# Patient Record
Sex: FEMALE | Race: BLACK OR AFRICAN AMERICAN | NOT HISPANIC OR LATINO | Employment: OTHER | URBAN - METROPOLITAN AREA
[De-identification: names, ages, dates, MRNs, and addresses within clinical notes are randomized per-mention and may not be internally consistent; named-entity substitution may affect disease eponyms.]

---

## 2017-08-10 ENCOUNTER — COMMUNICATION - HEALTHEAST (OUTPATIENT)
Dept: SCHEDULING | Facility: CLINIC | Age: 47
End: 2017-08-10

## 2018-09-05 ENCOUNTER — COMMUNICATION - HEALTHEAST (OUTPATIENT)
Dept: SCHEDULING | Facility: CLINIC | Age: 48
End: 2018-09-05

## 2019-09-18 ENCOUNTER — COMMUNICATION - HEALTHEAST (OUTPATIENT)
Dept: SCHEDULING | Facility: CLINIC | Age: 49
End: 2019-09-18

## 2021-06-01 NOTE — TELEPHONE ENCOUNTER
Pt was seen in ED yesterday for abdominal pain.   Pt had nausea.   Today patient started to vomit.   Unable to keep anything down. Pt states she can't eat.   Pt states she vomits even water.   Last time she had to urinate was yesterday.   Vomited more than 10 times.   Reason for Disposition    [1] Drinking very little AND [2] dehydration suspected (e.g., no urine > 12 hours, very dry mouth, very lightheaded)    [1] SEVERE vomiting (e.g., 6 or more times/day) AND [2] present > 8 hours    Protocols used: VOMITING-A-AH    Pt states she has not used the bathroom at all today. No stool or urine all day.   RN advised pt needs to go to the ED for evaluation. Pt will have her son drive her.   Ally Barron, RN   [facility] RN Triage

## 2021-06-16 PROBLEM — F12.10 CANNABIS USE DISORDER, MILD, ABUSE: Status: ACTIVE | Noted: 2019-07-08

## 2021-07-26 ENCOUNTER — APPOINTMENT (OUTPATIENT)
Dept: RADIOLOGY | Facility: CLINIC | Age: 51
End: 2021-07-26
Attending: STUDENT IN AN ORGANIZED HEALTH CARE EDUCATION/TRAINING PROGRAM
Payer: MEDICARE

## 2021-07-26 PROCEDURE — C9803 HOPD COVID-19 SPEC COLLECT: HCPCS

## 2021-07-26 PROCEDURE — 71046 X-RAY EXAM CHEST 2 VIEWS: CPT

## 2021-07-26 PROCEDURE — 99284 EMERGENCY DEPT VISIT MOD MDM: CPT | Mod: 25

## 2021-07-26 ASSESSMENT — MIFFLIN-ST. JEOR: SCORE: 1306.28

## 2021-07-27 ENCOUNTER — HOSPITAL ENCOUNTER (EMERGENCY)
Facility: CLINIC | Age: 51
Discharge: HOME | End: 2021-07-27
Attending: EMERGENCY MEDICINE | Admitting: EMERGENCY MEDICINE
Payer: MEDICARE

## 2021-07-27 VITALS
OXYGEN SATURATION: 99 % | RESPIRATION RATE: 18 BRPM | WEIGHT: 150 LBS | TEMPERATURE: 100 F | DIASTOLIC BLOOD PRESSURE: 82 MMHG | HEIGHT: 65 IN | BODY MASS INDEX: 24.99 KG/M2 | SYSTOLIC BLOOD PRESSURE: 132 MMHG | HEART RATE: 108 BPM

## 2021-07-27 DIAGNOSIS — J20.9 ACUTE BRONCHITIS, UNSPECIFIED ORGANISM: ICD-10-CM

## 2021-07-27 DIAGNOSIS — J45.21 MILD INTERMITTENT ASTHMA WITH EXACERBATION: ICD-10-CM

## 2021-07-27 LAB — SARS-COV-2 RNA RESP QL NAA+PROBE: NEGATIVE

## 2021-07-27 PROCEDURE — 250N000012 HC RX MED GY IP 250 OP 636 PS 637: Performed by: EMERGENCY MEDICINE

## 2021-07-27 PROCEDURE — 250N000013 HC RX MED GY IP 250 OP 250 PS 637: Performed by: EMERGENCY MEDICINE

## 2021-07-27 PROCEDURE — C9803 HOPD COVID-19 SPEC COLLECT: HCPCS

## 2021-07-27 PROCEDURE — 87635 SARS-COV-2 COVID-19 AMP PRB: CPT | Performed by: STUDENT IN AN ORGANIZED HEALTH CARE EDUCATION/TRAINING PROGRAM

## 2021-07-27 RX ORDER — PREDNISONE 20 MG/1
60 TABLET ORAL DAILY
Qty: 12 TABLET | Refills: 0 | Status: SHIPPED | OUTPATIENT
Start: 2021-07-28 | End: 2021-08-01

## 2021-07-27 RX ORDER — PREDNISONE 20 MG/1
60 TABLET ORAL ONCE
Status: COMPLETED | OUTPATIENT
Start: 2021-07-27 | End: 2021-07-27

## 2021-07-27 RX ORDER — ACETAMINOPHEN 325 MG/1
650 TABLET ORAL ONCE
Status: COMPLETED | OUTPATIENT
Start: 2021-07-27 | End: 2021-07-27

## 2021-07-27 RX ADMIN — ACETAMINOPHEN 650 MG: 325 TABLET ORAL at 03:02

## 2021-07-27 RX ADMIN — PREDNISONE 60 MG: 20 TABLET ORAL at 03:29

## 2021-07-27 NOTE — DISCHARGE INSTRUCTIONS
Continue your inhaler as previously prescribed  Follow-up with your primary care doctor in the next 1 to 2 days for recheck  Return to the emergency department for worsening problems or concerns

## 2021-07-27 NOTE — ED PROVIDER NOTES
EMERGENCY DEPARTMENT ENCOUnter      NAME: Fallon Sandhu  AGE: 50 year old female  YOB: 1970  MRN: [id number]  EVALUATION DATE & TIME: 7/27/2021  2:38 AM    PCP: No primary care provider on file.    ED PROVIDER: Elke Fonseca MD      Chief Complaint   Patient presents with     Cough         FINAL IMPRESSION:  1. Mild intermittent asthma with exacerbation    2. Acute bronchitis, unspecified organism          ED COURSE & MEDICAL DECISION MAKING:    PPE: N95    In summary, the patient is a 50-year-old female that presents to the emergency department for evaluation of a cough thought secondary to bronchitis and asthma exacerbation.  She has no evidence of pneumonia on her x-ray.  She no evidence of a COVID-19 infection.  3:21 AM I met with the patient, obtained history, performed an initial exam, and discussed options and plan for diagnostics and treatment here in the ED. Tylenol 650 mg p.o. was administered for pain.  Prednisone 60 mg p.o. was administered as an anti-inflammatory for her bronchospasm.      At the conclusion of the encounter I discussed the results of all of the tests and the disposition. The questions were answered. The patient or family acknowledged understanding and was agreeable with the care plan.         MEDICATIONS GIVEN IN THE EMERGENCY:  Medications   acetaminophen (TYLENOL) tablet 650 mg (650 mg Oral Given 7/27/21 0302)   predniSONE (DELTASONE) tablet 60 mg (60 mg Oral Given 7/27/21 0329)       NEW PRESCRIPTIONS STARTED AT TODAY'S ER VISIT  Discharge Medication List as of 7/27/2021  3:34 AM      START taking these medications    Details   predniSONE (DELTASONE) 20 MG tablet Take 3 tablets (60 mg) by mouth daily for 4 days Take two tablets (= 40mg) each day for 5 (five) days, Disp-12 tablet, R-0, Local Print                =================================================================    HPI        Fallon Sandhu is a 50 year old female with a pertinent history of  asthma, depression who presents to this ED for evaluation of cough.     The patient reports 1 week of coughing. She coughs so hard she vomits sometimes. The patient is also feeling week. She smokes cigarettes but does not drink. She is allergic to ibuprofen. No other complaints.       REVIEW OF SYSTEMS     Constitutional:  Denies fever or chills. Positive for weakness.   HENT:  Denies sore throat   Respiratory:  Denies shortness of breath. Positive for cough.    Cardiovascular:  Denies chest pain or palpitations  GI:  Denies abdominal pain, nausea. Positive for vomiting.   Musculoskeletal:  Denies any new extremity pain   Skin:  Denies rash   Neurologic:  Denies headache, focal weakness or sensory changes    All other systems reviewed and are negative      PAST MEDICAL HISTORY:  Past Medical History:   Diagnosis Date     Asthma      Depression      Migraines      Miscarriage      Pneumonia        PAST SURGICAL HISTORY:  Past Surgical History:   Procedure Laterality Date     APPENDECTOMY        SECTION      5x     CHOLECYSTECTOMY       RIGHT OOPHORECTOMY             CURRENT MEDICATIONS:    predniSONE (DELTASONE) 20 MG tablet  acetaminophen (TYLENOL EXTRA STRENGTH) 500 MG tablet  doxylamine (UNISON) 25 mg tablet  ibuprofen (ADVIL,MOTRIN) 600 MG tablet  omeprazole (PRILOSEC) 20 MG capsule  ondansetron (ZOFRAN ODT) 4 MG disintegrating tablet  ondansetron (ZOFRAN) 4 MG tablet  QUEtiapine (SEROQUEL) 200 MG tablet  traMADol (ULTRAM) 50 mg tablet        ALLERGIES:  Allergies   Allergen Reactions     Ibuprofen Itching       FAMILY HISTORY:  Family History   Problem Relation Age of Onset     Hypertension Mother      Diabetes Mother      Hypertension Father      Diabetes Father        SOCIAL HISTORY:   Social History     Socioeconomic History     Marital status: Single     Spouse name: Not on file     Number of children: Not on file     Years of education: Not on file     Highest education level: Not on file    Occupational History     Not on file   Tobacco Use     Smoking status: Current Every Day Smoker     Packs/day: 0.50     Years: 10.00     Pack years: 5.00     Smokeless tobacco: Never Used   Substance and Sexual Activity     Alcohol use: No     Drug use: Yes     Types: Marijuana     Sexual activity: Not on file   Other Topics Concern     Not on file   Social History Narrative     Not on file     Social Determinants of Health     Financial Resource Strain:      Difficulty of Paying Living Expenses:    Food Insecurity:      Worried About Running Out of Food in the Last Year:      Ran Out of Food in the Last Year:    Transportation Needs:      Lack of Transportation (Medical):      Lack of Transportation (Non-Medical):    Physical Activity:      Days of Exercise per Week:      Minutes of Exercise per Session:    Stress:      Feeling of Stress :    Social Connections:      Frequency of Communication with Friends and Family:      Frequency of Social Gatherings with Friends and Family:      Attends Mu-ism Services:      Active Member of Clubs or Organizations:      Attends Club or Organization Meetings:      Marital Status:    Intimate Partner Violence:      Fear of Current or Ex-Partner:      Emotionally Abused:      Physically Abused:      Sexually Abused:          PHYSICAL EXAM    Constitutional:  Well developed, Well nourished,  HENT:  Normocephalic, Atraumatic, Bilateral external ears normal, Oropharynx moist, Nose normal.   Neck:  Normal range of motion, No meningismus, No stridor.   Eyes:  EOMI, Conjunctiva normal, No discharge.   Respiratory:  No respiratory distress, bilateral wheezing few scattered rhonchi, No chest tenderness.   Cardiovascular:  Normal heart rate, Normal rhythm, No murmurs  GI:  Soft, No tenderness, No guarding, No CVA tenderness.   Musculoskeletal:  Neurovascularly intact distally, No edema, No tenderness, No cyanosis, Good range of motion in all major joints. No tenderness to palpation or  major deformities noted.   Integument:  Warm, Dry, No erythema, No rash.   Lymphatic:  No lymphadenopathy noted.   Neurologic:  Alert & oriented x 3, Normal motor function, Normal sensory function, No focal deficits noted.   Psychiatric:  Affect normal, Judgment normal, Mood normal.      LAB:  Results for orders placed or performed during the hospital encounter of 07/27/21   Chest XR,  PA & LAT    Impression    IMPRESSION: Negative chest.   SARS-COV2 (COVID-19) Virus RT-PCR    Specimen: Nasopharyngeal; Swab   Result Value Ref Range    SARS CoV2 PCR Negative Negative       RADIOLOGY:  I have independently reviewed and interpreted the above imaging, pending the final radiology read.  Chest XR,  PA & LAT   Final Result   IMPRESSION: Negative chest.                I, Heather Cardona, am serving as a scribe to document services personally performed by Dr. Fonseca based on my observation and the provider's statements to me. I, Elke Fonseca MD attest that Heather Cardona is acting in a scribe capacity, has observed my performance of the services and has documented them in accordance with my direction.    Elke Fonseca MD  Emergency Medicine  [facility] EMERGENCY ROOM  [address] [facility] [id number] [phone]  Dept: [phone]     Elke Fonseca MD  07/30/21 7226

## 2021-07-27 NOTE — ED TRIAGE NOTES
One week history of non productive cough. Has not been covid vaccination. History of asthma. All over body aches. Denies fever. States coughing so hard that she vomits. States she has not been vaccinated and she is scared to have it

## 2021-08-14 ENCOUNTER — HOSPITAL ENCOUNTER (EMERGENCY)
Facility: CLINIC | Age: 51
Discharge: HOME | End: 2021-08-14
Attending: EMERGENCY MEDICINE | Admitting: EMERGENCY MEDICINE
Payer: MEDICARE

## 2021-08-14 VITALS
DIASTOLIC BLOOD PRESSURE: 87 MMHG | TEMPERATURE: 98.7 F | SYSTOLIC BLOOD PRESSURE: 140 MMHG | BODY MASS INDEX: 24.99 KG/M2 | HEIGHT: 65 IN | OXYGEN SATURATION: 99 % | WEIGHT: 150 LBS | HEART RATE: 91 BPM | RESPIRATION RATE: 18 BRPM

## 2021-08-14 DIAGNOSIS — N10 ACUTE PYELONEPHRITIS: ICD-10-CM

## 2021-08-14 LAB
ALBUMIN SERPL-MCNC: 3.4 G/DL (ref 3.5–5)
ALBUMIN UR-MCNC: 30 MG/DL
ALP SERPL-CCNC: 94 U/L (ref 45–120)
ALT SERPL W P-5'-P-CCNC: 93 U/L (ref 0–45)
ANION GAP SERPL CALCULATED.3IONS-SCNC: 11 MMOL/L (ref 5–18)
APPEARANCE UR: ABNORMAL
AST SERPL W P-5'-P-CCNC: 76 U/L (ref 0–40)
BACTERIA #/AREA URNS HPF: ABNORMAL /HPF
BASOPHILS # BLD AUTO: 0 10E3/UL (ref 0–0.2)
BASOPHILS NFR BLD AUTO: 0 %
BILIRUB SERPL-MCNC: 0.6 MG/DL (ref 0–1)
BILIRUB UR QL STRIP: NEGATIVE
BUN SERPL-MCNC: 4 MG/DL (ref 8–22)
CALCIUM SERPL-MCNC: 9.4 MG/DL (ref 8.5–10.5)
CHLORIDE BLD-SCNC: 107 MMOL/L (ref 98–107)
CLUE CELLS: ABNORMAL
CO2 SERPL-SCNC: 22 MMOL/L (ref 22–31)
COLOR UR AUTO: YELLOW
CREAT SERPL-MCNC: 0.65 MG/DL (ref 0.6–1.1)
EOSINOPHIL # BLD AUTO: 0.1 10E3/UL (ref 0–0.7)
EOSINOPHIL NFR BLD AUTO: 1 %
ERYTHROCYTE [DISTWIDTH] IN BLOOD BY AUTOMATED COUNT: 13.1 % (ref 10–15)
GFR SERPL CREATININE-BSD FRML MDRD: >90 ML/MIN/1.73M2
GLUCOSE BLD-MCNC: 118 MG/DL (ref 70–125)
GLUCOSE UR STRIP-MCNC: NEGATIVE MG/DL
HCT VFR BLD AUTO: 42.2 % (ref 35–47)
HGB BLD-MCNC: 14.3 G/DL (ref 11.7–15.7)
HGB UR QL STRIP: ABNORMAL
IMM GRANULOCYTES # BLD: 0.1 10E3/UL
IMM GRANULOCYTES NFR BLD: 1 %
KETONES UR STRIP-MCNC: NEGATIVE MG/DL
LEUKOCYTE ESTERASE UR QL STRIP: ABNORMAL
LIPASE SERPL-CCNC: 9 U/L (ref 0–52)
LYMPHOCYTES # BLD AUTO: 2.7 10E3/UL (ref 0.8–5.3)
LYMPHOCYTES NFR BLD AUTO: 38 %
MAGNESIUM SERPL-MCNC: 1.7 MG/DL (ref 1.8–2.6)
MCH RBC QN AUTO: 31.3 PG (ref 26.5–33)
MCHC RBC AUTO-ENTMCNC: 33.9 G/DL (ref 31.5–36.5)
MCV RBC AUTO: 92 FL (ref 78–100)
MONOCYTES # BLD AUTO: 0.9 10E3/UL (ref 0–1.3)
MONOCYTES NFR BLD AUTO: 12 %
MUCOUS THREADS #/AREA URNS LPF: PRESENT /LPF
NEUTROPHILS # BLD AUTO: 3.5 10E3/UL (ref 1.6–8.3)
NEUTROPHILS NFR BLD AUTO: 48 %
NITRATE UR QL: POSITIVE
NRBC # BLD AUTO: 0 10E3/UL
NRBC BLD AUTO-RTO: 0 /100
PH UR STRIP: 6.5 [PH] (ref 5–7)
PLATELET # BLD AUTO: 370 10E3/UL (ref 150–450)
POTASSIUM BLD-SCNC: 3.7 MMOL/L (ref 3.5–5)
PROT SERPL-MCNC: 6.9 G/DL (ref 6–8)
RBC # BLD AUTO: 4.57 10E6/UL (ref 3.8–5.2)
RBC URINE: 5 /HPF
SODIUM SERPL-SCNC: 140 MMOL/L (ref 136–145)
SP GR UR STRIP: 1.02 (ref 1–1.03)
SQUAMOUS EPITHELIAL: 3 /HPF
TRICHOMONAS, WET PREP: ABNORMAL
UROBILINOGEN UR STRIP-MCNC: <2 MG/DL
WBC # BLD AUTO: 7.2 10E3/UL (ref 4–11)
WBC CLUMPS #/AREA URNS HPF: PRESENT /HPF
WBC URINE: 130 /HPF
WBC'S/HIGH POWER FIELD, WET PREP: ABNORMAL
YEAST, WET PREP: PRESENT

## 2021-08-14 PROCEDURE — 250N000013 HC RX MED GY IP 250 OP 250 PS 637: Performed by: EMERGENCY MEDICINE

## 2021-08-14 PROCEDURE — 96375 TX/PRO/DX INJ NEW DRUG ADDON: CPT

## 2021-08-14 PROCEDURE — 36415 COLL VENOUS BLD VENIPUNCTURE: CPT | Performed by: EMERGENCY MEDICINE

## 2021-08-14 PROCEDURE — 96365 THER/PROPH/DIAG IV INF INIT: CPT

## 2021-08-14 PROCEDURE — 83690 ASSAY OF LIPASE: CPT | Performed by: EMERGENCY MEDICINE

## 2021-08-14 PROCEDURE — 258N000003 HC RX IP 258 OP 636: Performed by: EMERGENCY MEDICINE

## 2021-08-14 PROCEDURE — 83735 ASSAY OF MAGNESIUM: CPT | Performed by: EMERGENCY MEDICINE

## 2021-08-14 PROCEDURE — 81001 URINALYSIS AUTO W/SCOPE: CPT | Performed by: EMERGENCY MEDICINE

## 2021-08-14 PROCEDURE — 87086 URINE CULTURE/COLONY COUNT: CPT | Performed by: EMERGENCY MEDICINE

## 2021-08-14 PROCEDURE — 80053 COMPREHEN METABOLIC PANEL: CPT | Performed by: EMERGENCY MEDICINE

## 2021-08-14 PROCEDURE — 85025 COMPLETE CBC W/AUTO DIFF WBC: CPT | Performed by: EMERGENCY MEDICINE

## 2021-08-14 PROCEDURE — 87210 SMEAR WET MOUNT SALINE/INK: CPT | Performed by: EMERGENCY MEDICINE

## 2021-08-14 PROCEDURE — 99284 EMERGENCY DEPT VISIT MOD MDM: CPT | Mod: 25

## 2021-08-14 PROCEDURE — 250N000011 HC RX IP 250 OP 636: Performed by: EMERGENCY MEDICINE

## 2021-08-14 RX ORDER — ONDANSETRON 4 MG/1
4 TABLET, ORALLY DISINTEGRATING ORAL EVERY 8 HOURS PRN
Qty: 10 TABLET | Refills: 0 | Status: SHIPPED | OUTPATIENT
Start: 2021-08-14 | End: 2021-08-17

## 2021-08-14 RX ORDER — MAGNESIUM OXIDE 400 MG/1
400 TABLET ORAL ONCE
Status: COMPLETED | OUTPATIENT
Start: 2021-08-14 | End: 2021-08-14

## 2021-08-14 RX ORDER — FLUCONAZOLE 150 MG/1
150 TABLET ORAL ONCE
Status: COMPLETED | OUTPATIENT
Start: 2021-08-14 | End: 2021-08-14

## 2021-08-14 RX ORDER — LEVOFLOXACIN 500 MG/1
500 TABLET, FILM COATED ORAL DAILY
Qty: 7 TABLET | Refills: 0 | Status: SHIPPED | OUTPATIENT
Start: 2021-08-14 | End: 2021-08-21

## 2021-08-14 RX ORDER — DIPHENHYDRAMINE HYDROCHLORIDE 50 MG/ML
25 INJECTION INTRAMUSCULAR; INTRAVENOUS ONCE
Status: COMPLETED | OUTPATIENT
Start: 2021-08-14 | End: 2021-08-14

## 2021-08-14 RX ORDER — LEVOFLOXACIN 5 MG/ML
500 INJECTION, SOLUTION INTRAVENOUS ONCE
Status: COMPLETED | OUTPATIENT
Start: 2021-08-14 | End: 2021-08-14

## 2021-08-14 RX ORDER — METOCLOPRAMIDE HYDROCHLORIDE 5 MG/ML
5 INJECTION INTRAMUSCULAR; INTRAVENOUS ONCE
Status: COMPLETED | OUTPATIENT
Start: 2021-08-14 | End: 2021-08-14

## 2021-08-14 RX ORDER — FLUCONAZOLE 150 MG/1
150 TABLET ORAL DAILY
Status: DISCONTINUED | OUTPATIENT
Start: 2021-08-14 | End: 2021-08-14

## 2021-08-14 RX ADMIN — FLUCONAZOLE 150 MG: 150 TABLET ORAL at 06:58

## 2021-08-14 RX ADMIN — LEVOFLOXACIN 500 MG: 5 INJECTION, SOLUTION INTRAVENOUS at 06:27

## 2021-08-14 RX ADMIN — MAGNESIUM OXIDE TAB 400 MG (241.3 MG ELEMENTAL MG) 400 MG: 400 (241.3 MG) TAB at 06:57

## 2021-08-14 RX ADMIN — DIPHENHYDRAMINE HYDROCHLORIDE 25 MG: 50 INJECTION INTRAMUSCULAR; INTRAVENOUS at 06:03

## 2021-08-14 RX ADMIN — METOCLOPRAMIDE 5 MG: 5 INJECTION, SOLUTION INTRAMUSCULAR; INTRAVENOUS at 06:05

## 2021-08-14 RX ADMIN — SODIUM CHLORIDE 1000 ML: 9 INJECTION, SOLUTION INTRAVENOUS at 06:02

## 2021-08-14 ASSESSMENT — ENCOUNTER SYMPTOMS
COUGH: 1
NAUSEA: 1
VOMITING: 1
DYSURIA: 1

## 2021-08-14 ASSESSMENT — MIFFLIN-ST. JEOR: SCORE: 1306.28

## 2021-08-14 NOTE — ED TRIAGE NOTES
"Pt c/o SOB, chest pain, nausea with  4 episodes of emesis. Pt states that she was recently dx with bronchitis. Pt says that she was supposed to follow up with her primary but they've been out of town. Pt complains of muscle aches \"all over\" and is in \"a lot of pain\".  Pt has had loss of appetite.   "

## 2021-08-14 NOTE — ED PROVIDER NOTES
NAME: Fallon Sandhu  AGE: 50 year old female  YOB: 1970  MRN: [id number]  EVALUATION DATE & TIME: No admission date for patient encounter.    PCP: No primary care provider on file.    ED PROVIDER: Brian Harrell M.D.      Chief Complaint   Patient presents with     Shortness of Breath     Chest Pain     Nausea       FINAL IMPRESSION:  1. Acute pyelonephritis        MEDICAL DECISION MAKIN:10 AM I met with the patient, obtained history, performed an initial exam, and discussed options and plan for diagnostics and treatment here in the ED.    PPE: Provider wore gloves, N95 mask, eye protection, surgical cap, and paper mask.      Patient was clinically assessed and consented to treatment. After assessment, medical decision making and workup were discussed with the patient. The patient was agreeable to plan for testing, workup, and treatment.  Pertinent Labs & Imaging studies reviewed. (See chart for details)         Fallon Sandhu is a 50 year oldfemale who presents with shortness of breath, chest tightness and nausea with vomiting.   Differential diagnosis includes but not limited to bronchitis, COVID-19, pneumonia, asthma exacerbation, dehydration, urinary tract infection, vulvovaginal candidiasis.  Patient presenting for multiple complaints.  Patient previously seen and diagnosed with bronchitis after negative Covid swab on the .  She reports this cough and shortness of breath has improved though elicit the symptoms.  She does not appear short of breath but reports reports body aches along with nausea and vomiting for the last several days.  She finished her course of steroids reports the cough is dramatically better after that along with the shortness of breath but does still have some wheezing which she relates to her asthma.  She does not appear in acute respiratory distress however will plan to likely administer nebulization once nausea is controlled.  Patient very anxious and  rocking in bed which on review patient does have some mental health and anxiety issues.  Given the nausea will administer Reglan and Benadryl along with some IV fluids.  Patient is allergic to ibuprofen but will hold off narcotic pain medication as patient does have chronic pain.  Patient also complained about vaginal or urethral burning and will check wet prep and urinalysis to.  Possibly urinary tract infection could contribute to symptoms.  Awaiting labs and urine and will recheck breathing after nebulizer treatment.  Lungs are otherwise clear and I do not suspect pneumonia.  Patient's COVID-19 test was also negative on the 27th.  Labs did show a left shift but no dramatic leukocytosis.  Hemoglobin is stable and electrolytes were unremarkable.  Urinalysis did show significant findings of urinary tract infection.  Patient started on Levaquin here and was given some IV fluids with good relief.  Patient much more comfortable and calmer after the Compazine and Benadryl.  I discussed findings with patient and she was feeling much better and comfortable going home.  Wet prep also returned showing likely candidiasis and patient will be given 1 dose of Diflucan here.  Following discussion with patient she will finish her infusion of Levaquin and be plan for discharge home with 1 week course of Levaquin along with Zofran for nausea.    The importance of close follow up was discussed. We reviewed warning signs and symptoms, and I instructed Ms. Sandhu to return to the emergency department immediately if she develops any new or worsening symptoms. I provided additional verbal discharge instructions. Ms. Sandhu expressed understanding and agreement with this plan of care, her questions were answered, and she was discharged in stable condition.       0 minutes of critical care time    MEDICATIONS GIVEN IN THE EMERGENCY:  Medications   levofloxacin (LEVAQUIN) infusion 500 mg (500 mg Intravenous New Bag 8/14/21 0627)    magnesium oxide (MAG-OX) tablet 400 mg (has no administration in time range)   fluconazole (DIFLUCAN) tablet 150 mg (has no administration in time range)   0.9% sodium chloride BOLUS (1,000 mLs Intravenous New Bag 8/14/21 0602)   metoclopramide (REGLAN) injection 5 mg (5 mg Intravenous Given 8/14/21 0605)   diphenhydrAMINE (BENADRYL) injection 25 mg (25 mg Intravenous Given 8/14/21 0603)       NEW PRESCRIPTIONS STARTED AT TODAY'S ER VISIT:  New Prescriptions    LEVOFLOXACIN (LEVAQUIN) 500 MG TABLET    Take 1 tablet (500 mg) by mouth daily for 7 days    ONDANSETRON (ZOFRAN ODT) 4 MG ODT TAB    Take 1 tablet (4 mg) by mouth every 8 hours as needed for nausea       =================================================================    HPI    Patient information was obtained from: Patient     Use of : N/A         Fallon Sandhu is a 50 year old female with a past medical history of schizophrenia, cholecystectomy, appendectomy, oophorectomy, and chronic pain disorder who presents to the ED via walk-in for evaluation of shortness of breath, chest pain, emesis, and body aches.     Patient was seen in the ED on 7/27 for a cough where she tested negative for COVID-19. Patient was started on a course of steroids, which has improved her cough. Patient endorses continued body aches, nausea, and intermittent episodes of emesis for the past couple of days. Patient states that her cough has improved but she is overall feeling worse.     Per chart review,   7/27/21 patient visited [facility] ED for evaluation of a cough. Cough thought secondary to bronchitis and asthma exacerbation. No evidence of pneumonia on XR. Negative COVID-19. Tylenol 650mg administered for pain, Prednisone 60 mg administered as anti-inflammatory for bronchospasm.      REVIEW OF SYSTEMS   Review of Systems   Constitutional:        Positive for body aches   Respiratory: Positive for cough.    Gastrointestinal: Positive for nausea and vomiting.    Genitourinary: Positive for dysuria and vaginal pain (Burning).   All other systems reviewed and are negative.       PAST MEDICAL HISTORY:  Past Medical History:   Diagnosis Date     Asthma      Depression      Migraines      Miscarriage      Pneumonia        PAST SURGICAL HISTORY:  Past Surgical History:   Procedure Laterality Date     APPENDECTOMY        SECTION      5x     CHOLECYSTECTOMY       RIGHT OOPHORECTOMY         CURRENT MEDICATIONS:      Current Facility-Administered Medications:      fluconazole (DIFLUCAN) tablet 150 mg, 150 mg, Oral, Once, Brian Harrell MD     levofloxacin (LEVAQUIN) infusion 500 mg, 500 mg, Intravenous, Once, Brian Harrell MD, Last Rate: 100 mL/hr at 21, 500 mg at 21     magnesium oxide (MAG-OX) tablet 400 mg, 400 mg, Oral, Once, Brian Harrell MD    Current Outpatient Medications:      levofloxacin (LEVAQUIN) 500 MG tablet, Take 1 tablet (500 mg) by mouth daily for 7 days, Disp: 7 tablet, Rfl: 0     ondansetron (ZOFRAN ODT) 4 MG ODT tab, Take 1 tablet (4 mg) by mouth every 8 hours as needed for nausea, Disp: 10 tablet, Rfl: 0     acetaminophen (TYLENOL EXTRA STRENGTH) 500 MG tablet, [ACETAMINOPHEN (TYLENOL EXTRA STRENGTH) 500 MG TABLET] Take 2 tablets (1,000 mg total) by mouth every 6 (six) hours as needed for pain., Disp: , Rfl: 0     doxylamine (UNISON) 25 mg tablet, [DOXYLAMINE (UNISON) 25 MG TABLET] Take 25-50 mg by mouth daily as needed for sleep., Disp: , Rfl:      ibuprofen (ADVIL,MOTRIN) 600 MG tablet, [IBUPROFEN (ADVIL,MOTRIN) 600 MG TABLET] Take 600 mg by mouth every 8 (eight) hours as needed for pain., Disp: , Rfl:      omeprazole (PRILOSEC) 20 MG capsule, [OMEPRAZOLE (PRILOSEC) 20 MG CAPSULE] Take 1 capsule (20 mg total) by mouth daily before breakfast., Disp: 30 capsule, Rfl: 0     ondansetron (ZOFRAN ODT) 4 MG disintegrating tablet, [ONDANSETRON (ZOFRAN ODT) 4 MG DISINTEGRATING TABLET] Take 1  tablet (4 mg total) by mouth every 8 (eight) hours as needed., Disp: 15 tablet, Rfl: 0     ondansetron (ZOFRAN) 4 MG tablet, [ONDANSETRON (ZOFRAN) 4 MG TABLET] Take 1 tablet (4 mg total) by mouth every 6 (six) hours., Disp: 12 tablet, Rfl: 0     QUEtiapine (SEROQUEL) 200 MG tablet, [QUETIAPINE (SEROQUEL) 200 MG TABLET] Take 200 mg by mouth daily before breakfast. , Disp: , Rfl:      traMADol (ULTRAM) 50 mg tablet, [TRAMADOL (ULTRAM) 50 MG TABLET] Take 1 tablet (50 mg total) by mouth every 6 (six) hours as needed for pain or severe pain (7-10)., Disp: 20 tablet, Rfl: 0    ALLERGIES:  Allergies   Allergen Reactions     Ibuprofen Itching       FAMILY HISTORY:  Family History   Problem Relation Age of Onset     Hypertension Mother      Diabetes Mother      Hypertension Father      Diabetes Father        SOCIAL HISTORY:   Social History     Socioeconomic History     Marital status: Single     Spouse name: Not on file     Number of children: Not on file     Years of education: Not on file     Highest education level: Not on file   Occupational History     Not on file   Tobacco Use     Smoking status: Current Every Day Smoker     Packs/day: 0.50     Years: 10.00     Pack years: 5.00     Smokeless tobacco: Never Used   Substance and Sexual Activity     Alcohol use: No     Drug use: Yes     Types: Marijuana     Sexual activity: Not on file   Other Topics Concern     Not on file   Social History Narrative     Not on file     Social Determinants of Health     Financial Resource Strain:      Difficulty of Paying Living Expenses:    Food Insecurity:      Worried About Running Out of Food in the Last Year:      Ran Out of Food in the Last Year:    Transportation Needs:      Lack of Transportation (Medical):      Lack of Transportation (Non-Medical):    Physical Activity:      Days of Exercise per Week:      Minutes of Exercise per Session:    Stress:      Feeling of Stress :    Social Connections:      Frequency of Communication  "with Friends and Family:      Frequency of Social Gatherings with Friends and Family:      Attends Shinto Services:      Active Member of Clubs or Organizations:      Attends Club or Organization Meetings:      Marital Status:    Intimate Partner Violence:      Fear of Current or Ex-Partner:      Emotionally Abused:      Physically Abused:      Sexually Abused:      PHYSICAL EXAM:    Vitals: /77   Pulse 99   Temp 98.7  F (37.1  C) (Oral)   Resp 18   Ht 1.651 m (5' 5\")   Wt 68 kg (150 lb)   SpO2 97%   BMI 24.96 kg/m     Physical Exam  Vitals and nursing note reviewed.   Constitutional:       General: She is not in acute distress.     Appearance: She is well-developed and normal weight. She is not ill-appearing or toxic-appearing.   HENT:      Head: Normocephalic.      Mouth/Throat:      Mouth: Mucous membranes are moist.   Eyes:      Extraocular Movements: Extraocular movements intact.      Pupils: Pupils are equal, round, and reactive to light.   Neck:      Trachea: No tracheal deviation.   Cardiovascular:      Rate and Rhythm: Normal rate and regular rhythm.   Pulmonary:      Effort: Pulmonary effort is normal. No tachypnea, bradypnea, accessory muscle usage or respiratory distress.      Breath sounds: Examination of the right-lower field reveals wheezing. Examination of the left-lower field reveals wheezing. Wheezing present. No decreased breath sounds, rhonchi or rales.   Abdominal:      Palpations: Abdomen is soft.      Tenderness: There is no abdominal tenderness.   Musculoskeletal:         General: Normal range of motion.      Cervical back: Normal range of motion.      Right lower leg: No tenderness. No edema.      Left lower leg: No tenderness. No edema.   Skin:     General: Skin is warm and dry.      Coloration: Skin is not cyanotic or pale.      Findings: No erythema.   Neurological:      General: No focal deficit present.      Mental Status: She is alert.   Psychiatric:         Attention " and Perception: Attention normal.         Mood and Affect: Mood is anxious.         Speech: Speech normal.         Behavior: Behavior is hyperactive.         Thought Content: Thought content is not paranoid or delusional.         Cognition and Memory: Cognition is not impaired.        LAB:  All pertinent labs reviewed and interpreted.  Labs Ordered and Resulted from Time of ED Arrival Up to the Time of Departure from the ED   COMPREHENSIVE METABOLIC PANEL - Abnormal; Notable for the following components:       Result Value    Urea Nitrogen 4 (*)     AST 76 (*)     ALT 93 (*)     Albumin 3.4 (*)     All other components within normal limits   MAGNESIUM - Abnormal; Notable for the following components:    Magnesium 1.7 (*)     All other components within normal limits   ROUTINE UA WITH MICROSCOPIC REFLEX TO CULTURE - Abnormal; Notable for the following components:    Appearance Urine Turbid (*)     Blood Urine 0.03 mg/dL (*)     Protein Albumin Urine 30  (*)     Nitrite Urine Positive (*)     Leukocyte Esterase Urine 500 Jerzy/uL (*)     Bacteria Urine Many (*)     WBC Clumps Urine Present (*)     Mucus Urine Present (*)     RBC Urine 5 (*)     WBC Urine 130 (*)     Squamous Epithelials Urine 3 (*)     All other components within normal limits    Narrative:     Urine Culture ordered based on laboratory criteria   CBC WITH PLATELETS AND DIFFERENTIAL - Abnormal; Notable for the following components:    Absolute Immature Granulocytes 0.1 (*)     All other components within normal limits   WET PREPARATION - Abnormal; Notable for the following components:    Yeast Present (*)     WBCs/high power field 2+ (*)     All other components within normal limits   LIPASE - Normal   CBC WITH PLATELETS & DIFFERENTIAL    Narrative:     The following orders were created for panel order CBC with platelets differential.  Procedure                               Abnormality         Status                     ---------                                -----------         ------                     CBC with platelets and d...[[id number]]  Abnormal            Final result                 Please view results for these tests on the individual orders.   PERIPHERAL IV CATHETER   PULSE OXIMETRY NURSING   URINE CULTURE       RADIOLOGY:  No orders to display     PROCEDURES:   Procedures       I, Petra Coelho, am serving as a scribe to document services personally performed by Dr. Brian Harrell  based on my observation and the provider's statements to me. I, Brian Harrell MD attest that Petra Coelho is acting in a scribe capacity, has observed my performance of the services and has documented them in accordance with my direction.      Brian Harrell M.D.  Emergency Medicine  [facility] EMERGENCY ROOM  [address] [facility] [id number] [phone]  Dept: [phone]       Brian Harrell MD  08/14/21 0646

## 2021-08-16 LAB — BACTERIA UR CULT: ABNORMAL

## 2022-01-14 ENCOUNTER — HOSPITAL ENCOUNTER (EMERGENCY)
Facility: CLINIC | Age: 52
Discharge: HOME | End: 2022-01-14
Attending: EMERGENCY MEDICINE | Admitting: EMERGENCY MEDICINE
Payer: MEDICARE

## 2022-01-14 VITALS
HEIGHT: 65 IN | SYSTOLIC BLOOD PRESSURE: 128 MMHG | WEIGHT: 181 LBS | RESPIRATION RATE: 18 BRPM | HEART RATE: 88 BPM | TEMPERATURE: 98.4 F | OXYGEN SATURATION: 98 % | BODY MASS INDEX: 30.16 KG/M2 | DIASTOLIC BLOOD PRESSURE: 79 MMHG

## 2022-01-14 DIAGNOSIS — U07.1 INFECTION DUE TO 2019 NOVEL CORONAVIRUS: ICD-10-CM

## 2022-01-14 DIAGNOSIS — R11.2 NAUSEA AND VOMITING: ICD-10-CM

## 2022-01-14 LAB
ALBUMIN SERPL-MCNC: 3.9 G/DL (ref 3.5–5)
ALBUMIN UR-MCNC: NEGATIVE MG/DL
ALP SERPL-CCNC: 95 U/L (ref 45–120)
ALT SERPL W P-5'-P-CCNC: 56 U/L (ref 0–45)
ANION GAP SERPL CALCULATED.3IONS-SCNC: 10 MMOL/L (ref 5–18)
APPEARANCE UR: CLEAR
AST SERPL W P-5'-P-CCNC: 47 U/L (ref 0–40)
BASOPHILS # BLD AUTO: 0 10E3/UL (ref 0–0.2)
BASOPHILS NFR BLD AUTO: 0 %
BILIRUB SERPL-MCNC: 0.4 MG/DL (ref 0–1)
BILIRUB UR QL STRIP: NEGATIVE
BUN SERPL-MCNC: 9 MG/DL (ref 8–22)
CALCIUM SERPL-MCNC: 9.8 MG/DL (ref 8.5–10.5)
CHLORIDE BLD-SCNC: 102 MMOL/L (ref 98–107)
CO2 SERPL-SCNC: 23 MMOL/L (ref 22–31)
COLOR UR AUTO: ABNORMAL
CREAT SERPL-MCNC: 0.76 MG/DL (ref 0.6–1.1)
EOSINOPHIL # BLD AUTO: 0 10E3/UL (ref 0–0.7)
EOSINOPHIL NFR BLD AUTO: 0 %
ERYTHROCYTE [DISTWIDTH] IN BLOOD BY AUTOMATED COUNT: 13.1 % (ref 10–15)
FLUAV RNA SPEC QL NAA+PROBE: NEGATIVE
FLUBV RNA RESP QL NAA+PROBE: NEGATIVE
GFR SERPL CREATININE-BSD FRML MDRD: >90 ML/MIN/1.73M2
GLUCOSE BLD-MCNC: 130 MG/DL (ref 70–125)
GLUCOSE UR STRIP-MCNC: NEGATIVE MG/DL
HCT VFR BLD AUTO: 47.6 % (ref 35–47)
HGB BLD-MCNC: 16.1 G/DL (ref 11.7–15.7)
HGB UR QL STRIP: NEGATIVE
IMM GRANULOCYTES # BLD: 0.1 10E3/UL
IMM GRANULOCYTES NFR BLD: 1 %
KETONES UR STRIP-MCNC: NEGATIVE MG/DL
LEUKOCYTE ESTERASE UR QL STRIP: NEGATIVE
LIPASE SERPL-CCNC: 19 U/L (ref 0–52)
LYMPHOCYTES # BLD AUTO: 1.6 10E3/UL (ref 0.8–5.3)
LYMPHOCYTES NFR BLD AUTO: 28 %
MAGNESIUM SERPL-MCNC: 1.8 MG/DL (ref 1.8–2.6)
MCH RBC QN AUTO: 30.8 PG (ref 26.5–33)
MCHC RBC AUTO-ENTMCNC: 33.8 G/DL (ref 31.5–36.5)
MCV RBC AUTO: 91 FL (ref 78–100)
MONOCYTES # BLD AUTO: 1.1 10E3/UL (ref 0–1.3)
MONOCYTES NFR BLD AUTO: 19 %
MUCOUS THREADS #/AREA URNS LPF: PRESENT /LPF
NEUTROPHILS # BLD AUTO: 2.9 10E3/UL (ref 1.6–8.3)
NEUTROPHILS NFR BLD AUTO: 52 %
NITRATE UR QL: NEGATIVE
NRBC # BLD AUTO: 0 10E3/UL
NRBC BLD AUTO-RTO: 0 /100
PH UR STRIP: 6.5 [PH] (ref 5–7)
PLATELET # BLD AUTO: 324 10E3/UL (ref 150–450)
POTASSIUM BLD-SCNC: 4 MMOL/L (ref 3.5–5)
PROT SERPL-MCNC: 7.4 G/DL (ref 6–8)
RBC # BLD AUTO: 5.22 10E6/UL (ref 3.8–5.2)
RBC URINE: <1 /HPF
SARS-COV-2 RNA RESP QL NAA+PROBE: POSITIVE
SODIUM SERPL-SCNC: 135 MMOL/L (ref 136–145)
SP GR UR STRIP: 1.01 (ref 1–1.03)
SQUAMOUS EPITHELIAL: 2 /HPF
UROBILINOGEN UR STRIP-MCNC: <2 MG/DL
WBC # BLD AUTO: 5.6 10E3/UL (ref 4–11)
WBC URINE: <1 /HPF

## 2022-01-14 PROCEDURE — 36415 COLL VENOUS BLD VENIPUNCTURE: CPT | Performed by: PHYSICIAN ASSISTANT

## 2022-01-14 PROCEDURE — C9803 HOPD COVID-19 SPEC COLLECT: HCPCS

## 2022-01-14 PROCEDURE — 258N000003 HC RX IP 258 OP 636: Performed by: PHYSICIAN ASSISTANT

## 2022-01-14 PROCEDURE — 83735 ASSAY OF MAGNESIUM: CPT | Performed by: PHYSICIAN ASSISTANT

## 2022-01-14 PROCEDURE — 96374 THER/PROPH/DIAG INJ IV PUSH: CPT

## 2022-01-14 PROCEDURE — 81001 URINALYSIS AUTO W/SCOPE: CPT | Performed by: PHYSICIAN ASSISTANT

## 2022-01-14 PROCEDURE — 83690 ASSAY OF LIPASE: CPT | Performed by: PHYSICIAN ASSISTANT

## 2022-01-14 PROCEDURE — 87636 SARSCOV2 & INF A&B AMP PRB: CPT | Performed by: EMERGENCY MEDICINE

## 2022-01-14 PROCEDURE — 250N000013 HC RX MED GY IP 250 OP 250 PS 637: Performed by: PHYSICIAN ASSISTANT

## 2022-01-14 PROCEDURE — 99284 EMERGENCY DEPT VISIT MOD MDM: CPT | Mod: 25

## 2022-01-14 PROCEDURE — 80053 COMPREHEN METABOLIC PANEL: CPT | Performed by: PHYSICIAN ASSISTANT

## 2022-01-14 PROCEDURE — 85025 COMPLETE CBC W/AUTO DIFF WBC: CPT | Performed by: PHYSICIAN ASSISTANT

## 2022-01-14 PROCEDURE — 96361 HYDRATE IV INFUSION ADD-ON: CPT

## 2022-01-14 PROCEDURE — 250N000011 HC RX IP 250 OP 636: Performed by: PHYSICIAN ASSISTANT

## 2022-01-14 RX ORDER — ONDANSETRON 2 MG/ML
4 INJECTION INTRAMUSCULAR; INTRAVENOUS ONCE
Status: COMPLETED | OUTPATIENT
Start: 2022-01-14 | End: 2022-01-14

## 2022-01-14 RX ORDER — ONDANSETRON 4 MG/1
4 TABLET, ORALLY DISINTEGRATING ORAL EVERY 8 HOURS PRN
Qty: 20 TABLET | Refills: 0 | Status: SHIPPED | OUTPATIENT
Start: 2022-01-14

## 2022-01-14 RX ORDER — ONDANSETRON 4 MG/1
4 TABLET, ORALLY DISINTEGRATING ORAL EVERY 8 HOURS PRN
Qty: 20 TABLET | Refills: 0 | Status: SHIPPED | OUTPATIENT
Start: 2022-01-14 | End: 2022-01-14

## 2022-01-14 RX ORDER — ACETAMINOPHEN 325 MG/1
975 TABLET ORAL ONCE
Status: COMPLETED | OUTPATIENT
Start: 2022-01-14 | End: 2022-01-14

## 2022-01-14 RX ADMIN — ONDANSETRON 4 MG: 2 INJECTION INTRAMUSCULAR; INTRAVENOUS at 18:29

## 2022-01-14 RX ADMIN — SODIUM CHLORIDE 1000 ML: 9 INJECTION, SOLUTION INTRAVENOUS at 18:29

## 2022-01-14 RX ADMIN — ACETAMINOPHEN 975 MG: 325 TABLET ORAL at 18:29

## 2022-01-14 ASSESSMENT — ENCOUNTER SYMPTOMS
HEADACHES: 1
FATIGUE: 1
FLANK PAIN: 0
CARDIOVASCULAR NEGATIVE: 1
ARTHRALGIAS: 1
NAUSEA: 1
VOMITING: 1
FREQUENCY: 0
HEMATOLOGIC/LYMPHATIC NEGATIVE: 1
RESPIRATORY NEGATIVE: 1
DIARRHEA: 0
HEMATURIA: 0
MYALGIAS: 1
CHILLS: 1
PSYCHIATRIC NEGATIVE: 1
ABDOMINAL PAIN: 1
DYSURIA: 0

## 2022-01-14 ASSESSMENT — MIFFLIN-ST. JEOR: SCORE: 1441.89

## 2022-01-14 NOTE — ED TRIAGE NOTES
The patient presents to the ED with generalized body aches, headache and vomiting. The patient reports a fever, reports her temp was 98 at home. But then states she did not check her temperature. She has not been tested for Covid. The patient was not vaccinated for covid.

## 2022-01-15 NOTE — DISCHARGE INSTRUCTIONS
Please use the Zofran as needed.  Please stay hydrated and eat well and rest.  You may use Tylenol and ibuprofen as needed for ongoing symptoms.  If you have have worsening symptoms consider returning to the ED.

## 2022-01-15 NOTE — ED PROVIDER NOTES
EMERGENCY DEPARTMENT ENCOUNTER      NAME: Fallon Sandhu  AGE: 51 year old female  YOB: 1970  MRN: [id number]  EVALUATION DATE & TIME: No admission date for patient encounter.    PCP: No primary care provider on file.    ED PROVIDER: Lalo Aburto PA-C      Chief Complaint   Patient presents with     Vomiting     Generalized Body Aches         FINAL IMPRESSION:  1. Infection due to 2019 novel coronavirus    2. Nausea and vomiting          MEDICAL DECISION MAKING:    Pertinent Labs & Imaging studies reviewed. (See chart for details)  51 year old female presents to the Emergency Department for evaluation of 3-day history of generalized body aches joint pains, headache, associate with nausea and vomiting and generalized abdominal pain.    After obtaining full history present illness, reviewing vitals and examined the patient briefly out in triage plan to screen with urinalysis, labs, provide Zofran and IV fluids.  Patient will also be tested for COVID-19 and influenza.  She has not been vaccinated for either.    Patient's COVID test did return positive.  I suspect that this is the cause of all her symptoms.  There is no significant electrolyte disturbances and there is no evidence of UTI.  Plan did manage symptoms with Zofran as an outpatient and encourage fluids, rest, use of Tylenol.  Overall based on benign physical exam and reassuring labs I did not feel that further work-up was necessary.    ED COURSE    I met with the patient, obtained history, performed an initial exam, and discussed options and plan for diagnostics and treatment here in the ED.    At the conclusion of the encounter I discussed the results of all of the tests and the disposition. The questions were answered. The patient or family acknowledged understanding and was agreeable with the care plan.     MEDICATIONS GIVEN IN THE EMERGENCY:  Medications   0.9% sodium chloride BOLUS (1,000 mLs Intravenous Started 1/14/22 1969)    ondansetron (ZOFRAN) injection 4 mg (4 mg Intravenous Given 22)   acetaminophen (TYLENOL) tablet 975 mg (975 mg Oral Given 22)       NEW PRESCRIPTIONS STARTED AT TODAY'S ER VISIT  New Prescriptions    No medications on file            =================================================================    HPI    Patient information was obtained from: Patient and review of previous medical records    Fallon Sandhu is a 51 year old female who presents to this ED for evaluation of generalized abdominal pain associate with nausea and vomiting.  Symptoms have been present for the last 3 days.  She denies also fever chills body aches and headache.  No complaints of diarrhea.  She denies any runny nose, sore throat, cough, chest pain, or shortness of breath.  She denies urinary symptoms.  She reports that she felt very similar about 5 months ago when she presented here she states that she had very low potassium.  Patient is not vaccinated for COVID-19 or influenza.  She has been attempting over-the-counter medications without relief of her symptoms.  No other ill contacts at home, she lives with her kids.      REVIEW OF SYSTEMS   Review of Systems   Constitutional: Positive for chills and fatigue.   HENT: Negative.    Respiratory: Negative.    Cardiovascular: Negative.    Gastrointestinal: Positive for abdominal pain, nausea and vomiting. Negative for diarrhea.   Genitourinary: Negative for dysuria, flank pain, frequency, hematuria and urgency.   Musculoskeletal: Positive for arthralgias and myalgias.   Skin: Negative.    Neurological: Positive for headaches.   Hematological: Negative.    Psychiatric/Behavioral: Negative.           PAST MEDICAL HISTORY:  Past Medical History:   Diagnosis Date     Asthma      Depression      Migraines      Miscarriage      Pneumonia        PAST SURGICAL HISTORY:  Past Surgical History:   Procedure Laterality Date     APPENDECTOMY        SECTION      5x      CHOLECYSTECTOMY       RIGHT OOPHORECTOMY           CURRENT MEDICATIONS:      Current Facility-Administered Medications:      0.9% sodium chloride BOLUS, 1,000 mL, Intravenous, Once, Lalo Aburto PA-C, Last Rate: 1,000 mL/hr at 01/14/22 1829, 1,000 mL at 01/14/22 1829    Current Outpatient Medications:      acetaminophen (TYLENOL EXTRA STRENGTH) 500 MG tablet, [ACETAMINOPHEN (TYLENOL EXTRA STRENGTH) 500 MG TABLET] Take 2 tablets (1,000 mg total) by mouth every 6 (six) hours as needed for pain., Disp: , Rfl: 0     doxylamine (UNISON) 25 mg tablet, [DOXYLAMINE (UNISON) 25 MG TABLET] Take 25-50 mg by mouth daily as needed for sleep., Disp: , Rfl:      ibuprofen (ADVIL,MOTRIN) 600 MG tablet, [IBUPROFEN (ADVIL,MOTRIN) 600 MG TABLET] Take 600 mg by mouth every 8 (eight) hours as needed for pain., Disp: , Rfl:      omeprazole (PRILOSEC) 20 MG capsule, [OMEPRAZOLE (PRILOSEC) 20 MG CAPSULE] Take 1 capsule (20 mg total) by mouth daily before breakfast., Disp: 30 capsule, Rfl: 0     ondansetron (ZOFRAN ODT) 4 MG disintegrating tablet, [ONDANSETRON (ZOFRAN ODT) 4 MG DISINTEGRATING TABLET] Take 1 tablet (4 mg total) by mouth every 8 (eight) hours as needed., Disp: 15 tablet, Rfl: 0     ondansetron (ZOFRAN) 4 MG tablet, [ONDANSETRON (ZOFRAN) 4 MG TABLET] Take 1 tablet (4 mg total) by mouth every 6 (six) hours., Disp: 12 tablet, Rfl: 0     QUEtiapine (SEROQUEL) 200 MG tablet, [QUETIAPINE (SEROQUEL) 200 MG TABLET] Take 200 mg by mouth daily before breakfast. , Disp: , Rfl:      traMADol (ULTRAM) 50 mg tablet, [TRAMADOL (ULTRAM) 50 MG TABLET] Take 1 tablet (50 mg total) by mouth every 6 (six) hours as needed for pain or severe pain (7-10)., Disp: 20 tablet, Rfl: 0      ALLERGIES:  Allergies   Allergen Reactions     Ibuprofen Itching       FAMILY HISTORY:  Family History   Problem Relation Age of Onset     Hypertension Mother      Diabetes Mother      Hypertension Father      Diabetes Father        SOCIAL HISTORY:   Social  "History     Socioeconomic History     Marital status: Single     Spouse name: Not on file     Number of children: Not on file     Years of education: Not on file     Highest education level: Not on file   Occupational History     Not on file   Tobacco Use     Smoking status: Current Every Day Smoker     Packs/day: 0.50     Years: 10.00     Pack years: 5.00     Smokeless tobacco: Never Used   Substance and Sexual Activity     Alcohol use: No     Drug use: Yes     Types: Marijuana     Sexual activity: Not on file   Other Topics Concern     Not on file   Social History Narrative     Not on file     Social Determinants of Health     Financial Resource Strain: Not on file   Food Insecurity: Not on file   Transportation Needs: Not on file   Physical Activity: Not on file   Stress: Not on file   Social Connections: Not on file   Intimate Partner Violence: Not on file   Housing Stability: Not on file       VITALS:  Patient Vitals for the past 24 hrs:   BP Temp Temp src Pulse Resp SpO2 Height Weight   01/14/22 1735 133/88 98.4  F (36.9  C) Temporal 99 18 97 % 1.651 m (5' 5\") 82.1 kg (181 lb)       PHYSICAL EXAM    Physical Exam  Constitutional:       General: She is not in acute distress.     Appearance: She is obese. She is not ill-appearing or toxic-appearing.   HENT:      Right Ear: External ear normal.      Left Ear: External ear normal.      Nose: Nose normal.   Eyes:      Conjunctiva/sclera: Conjunctivae normal.   Cardiovascular:      Rate and Rhythm: Normal rate.      Pulses: Normal pulses.   Pulmonary:      Effort: Pulmonary effort is normal. No respiratory distress.      Breath sounds: No stridor. No wheezing.   Abdominal:      General: Bowel sounds are normal. There is no distension.      Comments: Mild generalized abdominal tenderness without guarding or rebound present.  No CVA tenderness.   Musculoskeletal:         General: No deformity or signs of injury. Normal range of motion.   Skin:     Findings: No " bruising or rash.   Neurological:      General: No focal deficit present.      Mental Status: She is alert. Mental status is at baseline.      Sensory: No sensory deficit.      Motor: No weakness.   Psychiatric:         Mood and Affect: Mood normal.          LAB:  All pertinent labs reviewed and interpreted.  Results for orders placed or performed during the hospital encounter of 01/14/22   Symptomatic; Yes; 1/11/2022 Influenza A/B & SARS-CoV2 (COVID-19) Virus PCR Multiplex Nasopharyngeal    Specimen: Nasopharyngeal; Swab   Result Value Ref Range    Influenza A PCR Negative Negative    Influenza B PCR Negative Negative    SARS CoV2 PCR Positive (A) Negative   Comprehensive metabolic panel   Result Value Ref Range    Sodium 135 (L) 136 - 145 mmol/L    Potassium 4.0 3.5 - 5.0 mmol/L    Chloride 102 98 - 107 mmol/L    Carbon Dioxide (CO2) 23 22 - 31 mmol/L    Anion Gap 10 5 - 18 mmol/L    Urea Nitrogen 9 8 - 22 mg/dL    Creatinine 0.76 0.60 - 1.10 mg/dL    Calcium 9.8 8.5 - 10.5 mg/dL    Glucose 130 (H) 70 - 125 mg/dL    Alkaline Phosphatase 95 45 - 120 U/L    AST 47 (H) 0 - 40 U/L    ALT 56 (H) 0 - 45 U/L    Protein Total 7.4 6.0 - 8.0 g/dL    Albumin 3.9 3.5 - 5.0 g/dL    Bilirubin Total 0.4 0.0 - 1.0 mg/dL    GFR Estimate >90 >60 mL/min/1.73m2   Result Value Ref Range    Magnesium 1.8 1.8 - 2.6 mg/dL   Result Value Ref Range    Lipase 19 0 - 52 U/L   UA with Microscopic reflex to Culture    Specimen: Urine, Clean Catch   Result Value Ref Range    Color Urine Light Yellow Colorless, Straw, Light Yellow, Yellow    Appearance Urine Clear Clear    Glucose Urine Negative Negative mg/dL    Bilirubin Urine Negative Negative    Ketones Urine Negative Negative mg/dL    Specific Gravity Urine 1.014 1.001 - 1.030    Blood Urine Negative Negative    pH Urine 6.5 5.0 - 7.0    Protein Albumin Urine Negative Negative mg/dL    Urobilinogen Urine <2.0 <2.0 mg/dL    Nitrite Urine Negative Negative    Leukocyte Esterase Urine Negative  Negative    Mucus Urine Present (A) None Seen /LPF    RBC Urine <1 <=2 /HPF    WBC Urine <1 <=5 /HPF    Squamous Epithelials Urine 2 (H) <=1 /HPF   CBC with platelets and differential   Result Value Ref Range    WBC Count 5.6 4.0 - 11.0 10e3/uL    RBC Count 5.22 (H) 3.80 - 5.20 10e6/uL    Hemoglobin 16.1 (H) 11.7 - 15.7 g/dL    Hematocrit 47.6 (H) 35.0 - 47.0 %    MCV 91 78 - 100 fL    MCH 30.8 26.5 - 33.0 pg    MCHC 33.8 31.5 - 36.5 g/dL    RDW 13.1 10.0 - 15.0 %    Platelet Count 324 150 - 450 10e3/uL    % Neutrophils 52 %    % Lymphocytes 28 %    % Monocytes 19 %    % Eosinophils 0 %    % Basophils 0 %    % Immature Granulocytes 1 %    NRBCs per 100 WBC 0 <1 /100    Absolute Neutrophils 2.9 1.6 - 8.3 10e3/uL    Absolute Lymphocytes 1.6 0.8 - 5.3 10e3/uL    Absolute Monocytes 1.1 0.0 - 1.3 10e3/uL    Absolute Eosinophils 0.0 0.0 - 0.7 10e3/uL    Absolute Basophils 0.0 0.0 - 0.2 10e3/uL    Absolute Immature Granulocytes 0.1 <=0.4 10e3/uL    Absolute NRBCs 0.0 10e3/uL       RADIOLOGY:  Reviewed all pertinent imaging. Please see official radiology report.  No orders to display           Lalo Aburto PA-C  Emergency Medicine  [facility]     Lalo Aburto PA-C  01/14/22 1907       Lalo Aburto PA-C  01/14/22 1913

## 2022-04-05 ENCOUNTER — HOSPITAL ENCOUNTER (EMERGENCY)
Facility: CLINIC | Age: 52
Discharge: HOME | End: 2022-04-05
Attending: EMERGENCY MEDICINE | Admitting: EMERGENCY MEDICINE
Payer: MEDICARE

## 2022-04-05 ENCOUNTER — APPOINTMENT (OUTPATIENT)
Dept: CT IMAGING | Facility: CLINIC | Age: 52
End: 2022-04-05
Attending: EMERGENCY MEDICINE
Payer: MEDICARE

## 2022-04-05 VITALS
HEIGHT: 65 IN | OXYGEN SATURATION: 97 % | BODY MASS INDEX: 29.99 KG/M2 | DIASTOLIC BLOOD PRESSURE: 81 MMHG | SYSTOLIC BLOOD PRESSURE: 172 MMHG | WEIGHT: 180 LBS | HEART RATE: 75 BPM | RESPIRATION RATE: 16 BRPM | TEMPERATURE: 98.1 F

## 2022-04-05 DIAGNOSIS — R10.2 VAGINAL PAIN: ICD-10-CM

## 2022-04-05 DIAGNOSIS — K62.89 RECTAL PAIN: ICD-10-CM

## 2022-04-05 DIAGNOSIS — N76.0 BACTERIAL VAGINOSIS: ICD-10-CM

## 2022-04-05 DIAGNOSIS — B96.89 BACTERIAL VAGINOSIS: ICD-10-CM

## 2022-04-05 LAB
ALBUMIN UR-MCNC: NEGATIVE MG/DL
APPEARANCE UR: CLEAR
BILIRUB UR QL STRIP: NEGATIVE
CLUE CELLS: PRESENT
COLOR UR AUTO: COLORLESS
ERYTHROCYTE [DISTWIDTH] IN BLOOD BY AUTOMATED COUNT: 14.8 % (ref 10–15)
GLUCOSE UR STRIP-MCNC: NEGATIVE MG/DL
HCG UR QL: NEGATIVE
HCT VFR BLD AUTO: 50.5 % (ref 35–47)
HGB BLD-MCNC: 16.7 G/DL (ref 11.7–15.7)
HGB UR QL STRIP: NEGATIVE
KETONES UR STRIP-MCNC: NEGATIVE MG/DL
LEUKOCYTE ESTERASE UR QL STRIP: NEGATIVE
MCH RBC QN AUTO: 31.3 PG (ref 26.5–33)
MCHC RBC AUTO-ENTMCNC: 33.1 G/DL (ref 31.5–36.5)
MCV RBC AUTO: 95 FL (ref 78–100)
NITRATE UR QL: NEGATIVE
PH UR STRIP: 6 [PH] (ref 5–7)
PLATELET # BLD AUTO: 383 10E3/UL (ref 150–450)
RBC # BLD AUTO: 5.33 10E6/UL (ref 3.8–5.2)
RBC URINE: 0 /HPF
SP GR UR STRIP: 1 (ref 1–1.03)
SQUAMOUS EPITHELIAL: 1 /HPF
TRICHOMONAS, WET PREP: ABNORMAL
UROBILINOGEN UR STRIP-MCNC: <2 MG/DL
WBC # BLD AUTO: 6.2 10E3/UL (ref 4–11)
WBC URINE: <1 /HPF
WBC'S/HIGH POWER FIELD, WET PREP: ABNORMAL
YEAST, WET PREP: ABNORMAL

## 2022-04-05 PROCEDURE — 36415 COLL VENOUS BLD VENIPUNCTURE: CPT | Performed by: EMERGENCY MEDICINE

## 2022-04-05 PROCEDURE — 74177 CT ABD & PELVIS W/CONTRAST: CPT

## 2022-04-05 PROCEDURE — 99285 EMERGENCY DEPT VISIT HI MDM: CPT | Mod: 25

## 2022-04-05 PROCEDURE — 87491 CHLMYD TRACH DNA AMP PROBE: CPT | Performed by: EMERGENCY MEDICINE

## 2022-04-05 PROCEDURE — 96374 THER/PROPH/DIAG INJ IV PUSH: CPT | Mod: 59

## 2022-04-05 PROCEDURE — 250N000011 HC RX IP 250 OP 636: Performed by: EMERGENCY MEDICINE

## 2022-04-05 PROCEDURE — 87210 SMEAR WET MOUNT SALINE/INK: CPT | Performed by: EMERGENCY MEDICINE

## 2022-04-05 PROCEDURE — 81025 URINE PREGNANCY TEST: CPT | Performed by: EMERGENCY MEDICINE

## 2022-04-05 PROCEDURE — 81001 URINALYSIS AUTO W/SCOPE: CPT | Performed by: EMERGENCY MEDICINE

## 2022-04-05 PROCEDURE — 85027 COMPLETE CBC AUTOMATED: CPT | Performed by: EMERGENCY MEDICINE

## 2022-04-05 PROCEDURE — 96361 HYDRATE IV INFUSION ADD-ON: CPT

## 2022-04-05 PROCEDURE — 258N000003 HC RX IP 258 OP 636: Performed by: EMERGENCY MEDICINE

## 2022-04-05 PROCEDURE — 96375 TX/PRO/DX INJ NEW DRUG ADDON: CPT

## 2022-04-05 RX ORDER — METRONIDAZOLE 7.5 MG/G
1 GEL VAGINAL AT BEDTIME
Qty: 25 G | Refills: 0 | Status: SHIPPED | OUTPATIENT
Start: 2022-04-05 | End: 2022-04-10

## 2022-04-05 RX ORDER — ONDANSETRON 2 MG/ML
4 INJECTION INTRAMUSCULAR; INTRAVENOUS ONCE
Status: COMPLETED | OUTPATIENT
Start: 2022-04-05 | End: 2022-04-05

## 2022-04-05 RX ORDER — MORPHINE SULFATE 4 MG/ML
4 INJECTION, SOLUTION INTRAMUSCULAR; INTRAVENOUS ONCE
Status: COMPLETED | OUTPATIENT
Start: 2022-04-05 | End: 2022-04-05

## 2022-04-05 RX ORDER — IOPAMIDOL 755 MG/ML
100 INJECTION, SOLUTION INTRAVASCULAR ONCE
Status: COMPLETED | OUTPATIENT
Start: 2022-04-05 | End: 2022-04-05

## 2022-04-05 RX ADMIN — IOPAMIDOL 100 ML: 755 INJECTION, SOLUTION INTRAVENOUS at 21:26

## 2022-04-05 RX ADMIN — SODIUM CHLORIDE 1000 ML: 9 INJECTION, SOLUTION INTRAVENOUS at 20:44

## 2022-04-05 RX ADMIN — ONDANSETRON 4 MG: 2 INJECTION INTRAMUSCULAR; INTRAVENOUS at 21:08

## 2022-04-05 RX ADMIN — MORPHINE SULFATE 4 MG: 4 INJECTION, SOLUTION INTRAMUSCULAR; INTRAVENOUS at 21:09

## 2022-04-05 ASSESSMENT — ENCOUNTER SYMPTOMS
SHORTNESS OF BREATH: 0
COUGH: 0
ABDOMINAL PAIN: 0
CONSTIPATION: 0
NAUSEA: 0
HEMATURIA: 0
FEVER: 0
DIFFICULTY URINATING: 0
BLOOD IN STOOL: 0
DYSURIA: 0
ROS GI COMMENTS: +RECTAL PAIN
VOMITING: 0
DIARRHEA: 0

## 2022-04-05 NOTE — ED TRIAGE NOTES
The patient presents to the ED with c/o burning to vaginal area x5 days. Patient reports she has had strange symptoms since having COVID19 about 4 months ago. Denies any bleeding or discharge.

## 2022-04-06 LAB
C TRACH DNA SPEC QL PROBE+SIG AMP: NEGATIVE
N GONORRHOEA DNA SPEC QL NAA+PROBE: NEGATIVE

## 2022-04-06 NOTE — ED PROVIDER NOTES
EMERGENCY DEPARTMENT ENCOUNTER      NAME: Fallon Sandhu  AGE: 51 year old female  YOB: 1970  MRN: [id number]  EVALUATION DATE & TIME: 2022  6:54 PM    PCP: System, Provider Not In    ED PROVIDER: Faye Moy M.D.        Chief Complaint   Patient presents with     Vaginal Problem         FINAL IMPRESSION:    1. Rectal pain    2. Vaginal pain    3. Bacterial vaginosis            MEDICAL DECISION MAKIN year old female presents emergency department with vaginal and rectal pain.  Pelvic exam shows clue cells for bacterial vaginosis.  Rectal exam overall unremarkable without any masses appreciated.  CT imaging to the abdomen pelvis including the rectum is unremarkable.  Urinalysis and CBC unremarkable.  BNP there was some issues collecting this but I do not feel this is necessary as she had an unremarkable BMP just a few months ago I do not have concerns for acute electrolyte abnormality or acute renal failure.  Ultimately I feel patient should follow-up with primary care for repeat evaluation of her rectal and vaginal pain and will treat with vaginal Flagyl cream for bacterial vaginosis.  Patient agrees with the plan and all of her questions have been answered.          ED COURSE:  7:20 PM  I met with the patient to gather history and perform my exam. ED course and treatment discussed.    10:43 PM  Patient did not get the full fluid boluses her IV blew.  There is an issue also with her BMP not being completed.  Nursing about to be CHOP at this time I do not think she needs that test.  I do not have concern that this represents acute renal failure or other electrolyte abnormality.  She had a normal chemistry just a few months ago.  CT scan unremarkable.  Rest of her laboratories look good including urinalysis.  She does have some clue cells on pelvic exam.  Plan at this time is discharge home with prescription for vaginal Flagyl and have her follow-up with primary care as an  outpatient for reevaluation and repeat rectal exam.    10:48 PM  Patient updated on all results and feels comfortable with the plan for discharge home with prescription for vaginal Flagyl for bacterial vaginosis.  She understands importance of follow-up with primary care with regards to her rectal pain for reevaluation.    Nothing on exam to suggest acute bleeding hemorrhoids.  Do not appreciate any masses.  CT scan without findings for acute rectal mass or abscess.  Vaginal exam unremarkable except for clue cells found on swab otherwise no vaginal bleeding or significant pain with speculum or bimanual exam.  She does not appear toxic.    I do not think that this represents ACS, PE, ruptured AAA, aortic dissection, bowel obstruction, bowel ischemia, cholecystitis, pancreatitis, appendicitis, diverticulitis, kidney stone, pyelonephritis, incarcerated or strangulated hernia, ovarian torsion, PID, ectopic pregnancy, tubo-ovarian abscess, viscus perforation, perforated GI ulcer, or other such etiologies at this time.      COVID-19 PPE worn during patient evaluation:  Mask: n95 and homemade masks   Eye Protection: goggles   Gown: none   Hair cover: yes  Face shield: none   Patient wearing a mask: yes     At the conclusion of the encounter I discussed the results of all of the tests and the disposition. Their questions were answered. The patient (and any family present) acknowledged understanding and were agreeable with the care plan.        CONSULTANTS:  Referral to Primary care        MEDICATIONS GIVEN IN THE EMERGENCY:  Medications   0.9% sodium chloride BOLUS (1,000 mLs Intravenous New Bag 4/5/22 2044)   iopamidol (ISOVUE-370) solution 100 mL (100 mLs Intravenous Given 4/5/22 2126)   morphine (PF) injection 4 mg (4 mg Intravenous Given 4/5/22 2109)   ondansetron (ZOFRAN) injection 4 mg (4 mg Intravenous Given 4/5/22 2108)             NEW PRESCRIPTIONS STARTED AT TODAY'S ER VISIT     Medication List      Started     metroNIDAZOLE 0.75 % vaginal gel  Commonly known as: METROGEL  5 g, Vaginal, AT BEDTIME                CONDITION:  stable        DISPOSITION:  discharge home         =================================================================  =================================================================    HPI    Patient information was obtained from: patient    Use of Intrepreter: N/A     Fallon Sandhu is a 51 year old female with history of anemia who presents to the ER with complaints of vaginal and rectal pain for the last 1 week, worse over the past 5 days.  She states the pain in her rectum is worse than the vagina.  She denies any bleeding, discharge.  She denies putting any objects or anything into these areas.  She states that she has been having normal stools and normal urination.  She states the pain is burning in nature in both areas.    Otherwise denies any fevers, cough, chest pain, shortness of breath, vomiting, diarrhea, dysuria, hematuria.  She denies any rectal bleeding.  She reports normal stools.      REVIEW OF SYSTEMS  Review of Systems   Constitutional: Negative for fever.   Respiratory: Negative for cough and shortness of breath.    Cardiovascular: Negative for chest pain.   Gastrointestinal: Negative for abdominal pain, blood in stool, constipation, diarrhea, nausea and vomiting.        +rectal pain   Genitourinary: Positive for vaginal pain. Negative for difficulty urinating, dysuria, hematuria, vaginal bleeding and vaginal discharge.   Allergic/Immunologic: Negative for immunocompromised state.   All other systems reviewed and are negative.        PAST MEDICAL HISTORY:  Past Medical History:   Diagnosis Date     Asthma      Depression      Migraines      Miscarriage      Pneumonia          PAST SURGICAL HISTORY:  Past Surgical History:   Procedure Laterality Date     APPENDECTOMY        SECTION      5x     CHOLECYSTECTOMY       RIGHT OOPHORECTOMY           CURRENT MEDICATIONS:     Prior to Admission medications    Medication Sig Start Date End Date Taking? Authorizing Provider   acetaminophen (TYLENOL EXTRA STRENGTH) 500 MG tablet [ACETAMINOPHEN (TYLENOL EXTRA STRENGTH) 500 MG TABLET] Take 2 tablets (1,000 mg total) by mouth every 6 (six) hours as needed for pain. 9/4/18   Kaur Mclaughlin PA-C   doxylamine (UNISON) 25 mg tablet [DOXYLAMINE (UNISON) 25 MG TABLET] Take 25-50 mg by mouth daily as needed for sleep. 2/18/16   Provider, Historical   ibuprofen (ADVIL,MOTRIN) 600 MG tablet [IBUPROFEN (ADVIL,MOTRIN) 600 MG TABLET] Take 600 mg by mouth every 8 (eight) hours as needed for pain. 8/9/17   Provider, Historical   omeprazole (PRILOSEC) 20 MG capsule [OMEPRAZOLE (PRILOSEC) 20 MG CAPSULE] Take 1 capsule (20 mg total) by mouth daily before breakfast. 11/25/20   Corby Hayden MD   ondansetron (ZOFRAN ODT) 4 MG ODT tab Take 1 tablet (4 mg) by mouth every 8 hours as needed 1/14/22   Alison Delaney MD   ondansetron (ZOFRAN) 4 MG tablet [ONDANSETRON (ZOFRAN) 4 MG TABLET] Take 1 tablet (4 mg total) by mouth every 6 (six) hours. 9/18/19   Lionel Wylie MD   QUEtiapine (SEROQUEL) 200 MG tablet [QUETIAPINE (SEROQUEL) 200 MG TABLET] Take 200 mg by mouth daily before breakfast.  8/9/17   Provider, Historical   traMADol (ULTRAM) 50 mg tablet [TRAMADOL (ULTRAM) 50 MG TABLET] Take 1 tablet (50 mg total) by mouth every 6 (six) hours as needed for pain or severe pain (7-10). 8/9/17   Quan Estrada MD         ALLERGIES:  Allergies   Allergen Reactions     Ibuprofen Itching         FAMILY HISTORY:  Family History   Problem Relation Age of Onset     Hypertension Mother      Diabetes Mother      Hypertension Father      Diabetes Father          SOCIAL HISTORY:  Social History     Socioeconomic History     Marital status: Single     Spouse name: Not on file     Number of children: Not on file     Years of education: Not on file     Highest education level: Not on file   Occupational History      "Not on file   Tobacco Use     Smoking status: Current Every Day Smoker     Packs/day: 0.50     Years: 10.00     Pack years: 5.00     Smokeless tobacco: Never Used   Substance and Sexual Activity     Alcohol use: No     Drug use: Yes     Types: Marijuana     Sexual activity: Not on file   Other Topics Concern     Not on file   Social History Narrative     Not on file     Social Determinants of Health     Financial Resource Strain: Not on file   Food Insecurity: Not on file   Transportation Needs: Not on file   Physical Activity: Not on file   Stress: Not on file   Social Connections: Not on file   Intimate Partner Violence: Not on file   Housing Stability: Not on file         VITALS:  Patient Vitals for the past 24 hrs:   BP Temp Temp src Pulse Resp SpO2 Height Weight   04/05/22 1604 (!) 172/81 98.1  F (36.7  C) Oral 75 16 97 % 1.651 m (5' 5\") 81.6 kg (180 lb)       Wt Readings from Last 3 Encounters:   04/05/22 81.6 kg (180 lb)   08/14/21 68 kg (150 lb)   07/26/21 68 kg (150 lb)         PHYSICAL EXAM    Constitutional:  Well developed, Well nourished, NAD, GCS 15  HENT:  Normocephalic, Atraumatic, Bilateral external ears normal, Nose normal. Neck-  Normal range of motion, No tenderness, Supple, No stridor.   Eyes:  PERRL, EOMI, Conjunctiva normal, No discharge.  Respiratory:  Normal breath sounds, No respiratory distress, No wheezing, Speaks full sentences easily. No cough.   Cardiovascular:  Normal heart rate, Regular rhythm, No murmurs, No rubs, No gallops. Chest wall nontender.   GI:  No excessive obesity.  Bowel sounds normal, Soft, No tenderness, No masses, No flank tenderness. No rebound or guarding. Rectal exam: External exam is negative for any hemorrhoids.  Digital exam does appreciate some fullness in the distal aspect of the rectum of bone no definite fluctuance.  No gross blood.  Brown stool noted.  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  Speculum exam " shows a small amount of white discharge otherwise no vaginal blood or other signs of abnormality.  Musculoskeletal: No cyanosis, No clubbing. Good range of motion in all major joints. No major deformities noted.   Integument:  Warm, Dry, No erythema, No rash.  No petechiae.   Neurologic:  Alert & oriented x 3, No focal deficits noted.   Psychiatric:  Affect normal, Cooperative         LAB:  All pertinent labs reviewed and interpreted.  Recent Results (from the past 24 hour(s))   UA with Microscopic reflex to Culture    Collection Time: 04/05/22  4:09 PM    Specimen: Urine, Midstream   Result Value Ref Range    Color Urine Colorless Colorless, Straw, Light Yellow, Yellow    Appearance Urine Clear Clear    Glucose Urine Negative Negative mg/dL    Bilirubin Urine Negative Negative    Ketones Urine Negative Negative mg/dL    Specific Gravity Urine 1.005 1.001 - 1.030    Blood Urine Negative Negative    pH Urine 6.0 5.0 - 7.0    Protein Albumin Urine Negative Negative mg/dL    Urobilinogen Urine <2.0 <2.0 mg/dL    Nitrite Urine Negative Negative    Leukocyte Esterase Urine Negative Negative    RBC Urine 0 <=2 /HPF    WBC Urine <1 <=5 /HPF    Squamous Epithelials Urine 1 <=1 /HPF   HCG qualitative urine    Collection Time: 04/05/22  4:09 PM   Result Value Ref Range    hCG Urine Qualitative Negative Negative   Wet prep    Collection Time: 04/05/22  7:58 PM    Specimen: Vagina; Swab   Result Value Ref Range    Trichomonas Absent Absent    Yeast Absent Absent    Clue Cells Present (A) Absent    WBCs/high power field 1+ (A) None   CBC (+ platelets, no diff)    Collection Time: 04/05/22  8:34 PM   Result Value Ref Range    WBC Count 6.2 4.0 - 11.0 10e3/uL    RBC Count 5.33 (H) 3.80 - 5.20 10e6/uL    Hemoglobin 16.7 (H) 11.7 - 15.7 g/dL    Hematocrit 50.5 (H) 35.0 - 47.0 %    MCV 95 78 - 100 fL    MCH 31.3 26.5 - 33.0 pg    MCHC 33.1 31.5 - 36.5 g/dL    RDW 14.8 10.0 - 15.0 %    Platelet Count 383 150 - 450 10e3/uL       No  results found [facility]: [facility]        RADIOLOGY:  Reviewed all pertinent imaging. Please see official radiology report.    CT Abdomen Pelvis w Contrast   Final Result   IMPRESSION:    1.  No acute findings.   2.  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   3.  Colonic diverticulosis with no diverticulitis.   4.  Moderate diffuse hepatic steatosis   5.  Several adjacent paraumbilical fat-containing ventral hernias unchanged.            EKG:    none      PROCEDURES:  none      Faye Moy M.D. [facility]  Emergency Medicine and Medical Toxicology  [facility]ly [facility] EMERGENCY ROOM  [address] [id number] [phone]  Dept: [phone]           Faye Moy MD  04/05/22 3946

## 2022-04-06 NOTE — DISCHARGE INSTRUCTIONS
Pelvic exam shows that you have bacterial vaginosis.  Use the vaginal cream to help treat this.    No other cause for your pain has been found.  I recommend you follow-up with primary care doctor in the next 1-2 days for reevaluation.    Return the emergency department if you develop worse pain, fevers, vomiting, bloody stools, or any other concerns.    Thank you for choosing [facility] Emergency Department.  It has been my pleasure caring for you today.     ~Dr. Farzaneh MD

## 2022-04-24 ENCOUNTER — HOSPITAL ENCOUNTER (EMERGENCY)
Facility: CLINIC | Age: 52
Discharge: HOME | End: 2022-04-24
Attending: EMERGENCY MEDICINE | Admitting: EMERGENCY MEDICINE
Payer: MEDICARE

## 2022-04-24 VITALS
RESPIRATION RATE: 22 BRPM | HEART RATE: 90 BPM | SYSTOLIC BLOOD PRESSURE: 136 MMHG | TEMPERATURE: 98 F | OXYGEN SATURATION: 96 % | DIASTOLIC BLOOD PRESSURE: 86 MMHG

## 2022-04-24 DIAGNOSIS — N30.00 ACUTE CYSTITIS WITHOUT HEMATURIA: ICD-10-CM

## 2022-04-24 LAB
ALBUMIN UR-MCNC: 100 MG/DL
APPEARANCE UR: ABNORMAL
BILIRUB UR QL STRIP: NEGATIVE
COLOR UR AUTO: ABNORMAL
GLUCOSE UR STRIP-MCNC: NEGATIVE MG/DL
HCG UR QL: NEGATIVE
HGB UR QL STRIP: ABNORMAL
KETONES UR STRIP-MCNC: NEGATIVE MG/DL
LEUKOCYTE ESTERASE UR QL STRIP: ABNORMAL
NITRATE UR QL: NEGATIVE
PH UR STRIP: 6.5 [PH] (ref 5–7)
RBC URINE: 2 /HPF
SP GR UR STRIP: 1 (ref 1–1.03)
SQUAMOUS EPITHELIAL: 1 /HPF
UROBILINOGEN UR STRIP-MCNC: <2 MG/DL
WBC CLUMPS #/AREA URNS HPF: PRESENT /HPF
WBC URINE: 128 /HPF

## 2022-04-24 PROCEDURE — 96372 THER/PROPH/DIAG INJ SC/IM: CPT | Performed by: PHYSICIAN ASSISTANT

## 2022-04-24 PROCEDURE — 81025 URINE PREGNANCY TEST: CPT | Performed by: EMERGENCY MEDICINE

## 2022-04-24 PROCEDURE — 99284 EMERGENCY DEPT VISIT MOD MDM: CPT | Mod: 25

## 2022-04-24 PROCEDURE — 87186 SC STD MICRODIL/AGAR DIL: CPT | Performed by: EMERGENCY MEDICINE

## 2022-04-24 PROCEDURE — 250N000011 HC RX IP 250 OP 636: Performed by: PHYSICIAN ASSISTANT

## 2022-04-24 PROCEDURE — 81001 URINALYSIS AUTO W/SCOPE: CPT | Performed by: EMERGENCY MEDICINE

## 2022-04-24 RX ORDER — KETOROLAC TROMETHAMINE 15 MG/ML
15 INJECTION, SOLUTION INTRAMUSCULAR; INTRAVENOUS ONCE
Status: COMPLETED | OUTPATIENT
Start: 2022-04-24 | End: 2022-04-24

## 2022-04-24 RX ORDER — KETOROLAC TROMETHAMINE 15 MG/ML
15 INJECTION, SOLUTION INTRAMUSCULAR; INTRAVENOUS ONCE
Status: DISCONTINUED | OUTPATIENT
Start: 2022-04-24 | End: 2022-04-24

## 2022-04-24 RX ORDER — CIPROFLOXACIN 500 MG/1
500 TABLET, FILM COATED ORAL 2 TIMES DAILY
Qty: 10 TABLET | Refills: 0 | Status: SHIPPED | OUTPATIENT
Start: 2022-04-24 | End: 2022-04-29

## 2022-04-24 RX ORDER — NITROFURANTOIN 25; 75 MG/1; MG/1
100 CAPSULE ORAL 2 TIMES DAILY
Qty: 10 CAPSULE | Refills: 0 | Status: SHIPPED | OUTPATIENT
Start: 2022-04-24 | End: 2022-04-24 | Stop reason: ALTCHOICE

## 2022-04-24 RX ADMIN — KETOROLAC TROMETHAMINE 15 MG: 15 INJECTION, SOLUTION INTRAMUSCULAR; INTRAVENOUS at 06:37

## 2022-04-24 ASSESSMENT — ENCOUNTER SYMPTOMS
WHEEZING: 0
CONSTIPATION: 0
NEUROLOGICAL NEGATIVE: 1
FEVER: 0
CARDIOVASCULAR NEGATIVE: 1
CHILLS: 0
SHORTNESS OF BREATH: 0
PSYCHIATRIC NEGATIVE: 1
ABDOMINAL PAIN: 0
ABDOMINAL DISTENTION: 0
VOMITING: 0
FREQUENCY: 1
HEMATURIA: 1
CHEST TIGHTNESS: 0
FATIGUE: 0
DYSURIA: 1
MUSCULOSKELETAL NEGATIVE: 1
NAUSEA: 0

## 2022-04-24 NOTE — ED PROVIDER NOTES
Emergency Department Encounter     Evaluation Date & Time:   2022  6:24 AM    CHIEF COMPLAINT:  Dysuria      Triage Note:       Impression and Plan     ED COURSE & MEDICAL DECISION MAKIN:28 AM Patient seen and evaluated. Discussed plan of care and laboratory testing. Staffed with Dr. Mosqueda.  7:16 AM Patient seen and reevaluated, pain improved. Discussed urinalysis results. Plan for discharge on abx.     At the conclusion of the encounter I discussed the results of all the tests and the disposition. The questions were answered. The patient or family acknowledged understanding and was agreeable with the care plan.    FINAL IMPRESSION:    ICD-10-CM    1. Acute cystitis without hematuria  N30.00        0 minutes of critical care time    Reassessments & Consults: [facility]  ED Course as of 22 0718   Sun 2022   0640 Patient is a 51-year-old female who presents to the ED for evaluation of urinary frequency, dysuria which started approximately yesterday morning.  Patient notes that she has had significant urinary frequency over the last day, pain.  Denies any fevers at home.  On examination does exhibit some right CVA tenderness, urine does appear to be slightly red-tinged in color.  Denies any other symptoms, denies any other abdominal pain, vaginal discharge, vaginal bleeding, abdominal examination is grossly benign.  Patient is nontachycardic, nontoxic appearing.  Differential includes acute cystitis, less likely to be pyelonephritis given the patient's vital signs, examination, also less likely to be nephrolithiasis.  I do not think she warrants further imaging at this time.  Plan will be to obtain urinalysis to check for infection, patient will also get some Toradol IM here for pain control.   0647 HCG Qual Urine: Negative   0701 UA with Microscopic reflex to Culture(!)  Urinalysis is consistent with acute cystitis. WBC clumps present.    0716 Urinary results discussed with patient, UA is  consistent with acute cystitis.  Given the fact that you are also having some right flank pain, plan will be to cover you for pyelonephritis as well.  You will be discharged home on ciprofloxacin 500 mg which she will take twice a day for the next 5 days to treat urinary tract infection.  Given the rest of her examination is relatively benign, I think it is reasonable to diagnose her with acute cystitis, discharge at this time.  Recommend that she continues to take ibuprofen or Tylenol at home for pain.  She can follow-up with primary care in 1 to 2 weeks if she continues to have symptoms.  Return to the ED if she develops worsening fever, or other symptoms.  Discussed return precautions with the patient, she expressed understanding.  Plan for discharge.       MEDICATIONS GIVEN IN THE EMERGENCY DEPARTMENT:  Medications   ketorolac (TORADOL) injection 15 mg (15 mg Intramuscular Given 4/24/22 0637)       NEW PRESCRIPTIONS STARTED AT TODAY'S ED VISIT:  New Prescriptions    CIPROFLOXACIN (CIPRO) 500 MG TABLET    Take 1 tablet (500 mg) by mouth 2 times daily for 5 days       HPI     HPI     Fallon Sandhu is a 51 year old female with a pertinent history of undifferentiated schizophrenia, chronic pain disorder, cannabis abuse who presents to this ED for evaluation of urinary frequency, dysuria, hematuria.  Patient states that starting yesterday morning she developed pain with urination.  Additionally she has had frequent urination over the last day.  She notices burning when she pees.  She denies any other symptoms.  She is afebrile, and has not had any fevers at home.  She denies taking any medications for pain.  She denies any other symptoms.  Denies any vaginal bleeding or discharge.  Denies any history of kidney stones.  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  Denies any other complaints at this time.  Denies any shortness of breath or chest pain.    REVIEW OF  SYSTEMS:  Review of Systems   Constitutional: Negative for chills, fatigue and fever.   HENT: Negative.    Respiratory: Negative for chest tightness, shortness of breath and wheezing.    Cardiovascular: Negative.    Gastrointestinal: Negative for abdominal distention, abdominal pain, constipation, nausea and vomiting.   Genitourinary: Positive for dysuria, frequency and hematuria. Negative for vaginal bleeding, vaginal discharge and vaginal pain.   Musculoskeletal: Negative.    Neurological: Negative.    Psychiatric/Behavioral: Negative.      Medical History     Past Medical History:   Diagnosis Date     Asthma      Depression      Migraines      Miscarriage      Pneumonia        Past Surgical History:   Procedure Laterality Date     APPENDECTOMY        SECTION      5x     CHOLECYSTECTOMY       RIGHT OOPHORECTOMY         Family History   Problem Relation Age of Onset     Hypertension Mother      Diabetes Mother      Hypertension Father      Diabetes Father        Social History     Tobacco Use     Smoking status: Current Every Day Smoker     Packs/day: 0.50     Years: 10.00     Pack years: 5.00     Smokeless tobacco: Never Used   Substance Use Topics     Alcohol use: No     Drug use: Yes     Types: Marijuana       ciprofloxacin (CIPRO) 500 MG tablet  acetaminophen (TYLENOL EXTRA STRENGTH) 500 MG tablet  doxylamine (UNISON) 25 mg tablet  ibuprofen (ADVIL,MOTRIN) 600 MG tablet  omeprazole (PRILOSEC) 20 MG capsule  ondansetron (ZOFRAN ODT) 4 MG ODT tab  ondansetron (ZOFRAN) 4 MG tablet  QUEtiapine (SEROQUEL) 200 MG tablet  traMADol (ULTRAM) 50 mg tablet        Physical Exam     First Vitals:  Patient Vitals for the past 24 hrs:   BP Temp Temp src Pulse Resp SpO2   22 0628 136/86 98  F (36.7  C) Oral 90 22 96 %       PHYSICAL EXAM:   Physical Exam  Constitutional:       General: She is not in acute distress.     Appearance: She is obese. She is not toxic-appearing or diaphoretic.   HENT:      Head:  Normocephalic.      Nose: Nose normal.      Mouth/Throat:      Mouth: Mucous membranes are moist.   Eyes:      Extraocular Movements: Extraocular movements intact.   Cardiovascular:      Rate and Rhythm: Normal rate and regular rhythm.      Heart sounds: Normal heart sounds. No murmur heard.    No gallop.   Pulmonary:      Effort: Pulmonary effort is normal. No respiratory distress.      Breath sounds: Normal breath sounds.   Abdominal:      General: Abdomen is flat.      Palpations: Abdomen is soft.      Tenderness: There is no abdominal tenderness. There is right CVA tenderness. There is no left CVA tenderness or guarding.   Musculoskeletal:      Cervical back: Normal range of motion.   Skin:     General: Skin is warm.      Findings: No erythema or rash.   Neurological:      General: No focal deficit present.      Mental Status: She is alert and oriented to person, place, and time.         Results     LAB:  All pertinent labs reviewed and interpreted  Labs Ordered and Resulted from Time of ED Arrival to Time of ED Departure   ROUTINE UA WITH MICROSCOPIC REFLEX TO CULTURE - Abnormal       Result Value    Color Urine Pink (*)     Appearance Urine Turbid (*)     Glucose Urine Negative      Bilirubin Urine Negative      Ketones Urine Negative      Specific Gravity Urine 1.003      Blood Urine >1.0 mg/dL (*)     pH Urine 6.5      Protein Albumin Urine 100  (*)     Urobilinogen Urine <2.0      Nitrite Urine Negative      Leukocyte Esterase Urine 500 Jerzy/uL (*)     WBC Clumps Urine Present (*)     RBC Urine 2      WBC Urine 128 (*)     Squamous Epithelials Urine 1     HCG QUALITATIVE URINE - Normal    hCG Urine Qualitative Negative     URINE CULTURE       RADIOLOGY:  No orders to display            PROCEDURES:  Procedures:    [facility] Documentation        CMS Diagnoses:       Jayro Hussein PA-C  Emergency Medicine  [facility] EMERGENCY ROOM       Jayro Hussein  ABDOUL  04/24/22 0719

## 2022-04-24 NOTE — ED PROVIDER NOTES
Emergency Department Midlevel Supervisory Note     I personally saw the patient and performed a substantive portion of the visit including all aspects of the medical decision making.    ED Course:  6:29 AM Samantha Hussein PA-C staffed patient with me. I agree with their assessment and plan of management, and I will see the patient.  7:11 AM I met with the patient to introduce myself, gather additional history, perform my initial exam, and discuss the plan.     Brief HPI:     Fallon Sandhu is a 51 year old female who presents for evaluation of abdominal pain and dysuria.    I, Petra Coelho, am serving as a scribe to document services personally performed by Dr. Mosqueda, based on my observations and the provider's statements to me.   I, Dr. Mosqueda, attest that Petra Coelho was acting in a scribe capacity, has observed my performance of the services and has documented them in accordance with my direction.    Brief Physical Exam:  Constitutional:  Alert, in no acute distress  EYES: Conjunctivae clear  HENT:  Atraumatic, normocephalic  Respiratory:  Respirations even, unlabored, in no acute respiratory distress  Cardiovascular:  Regular rate and rhythm, good peripheral perfusion  Abdomen: There is mild suprapubic tenderness.  No true CVA tenderness.  Musculoskeletal:  No edema. No cyanosis. Range of motion major extremities intact.    Integument: Warm, Dry, No erythema, No rash.   Neurologic:  Alert & oriented, no focal deficits noted  Psych: Normal mood and affect     MDM:  Patient is a 51-year-old female who presents to the emergency department with dysuria and lower abdominal pain.  She also indicates some pain which wraps around to her right low back although she does not have any true CVA tenderness on exam.  Urine analysis consistent with infection.  Urine culture from last year showed pansensitive E. coli.  She is systemically well and afebrile here.  She will be started on ciprofloxacin in the event  that there is any early pyelonephritis with her pain that radiates around to her back.  Otherwise based on her reassuring exam and vital signs and I think there is any further indication for lab or imaging work-up at this point.  No suspicion for other acute intra-abdominal process like appendicitis, infected kidney stone, etc. Patient was discharged in stable condition.      1. Acute cystitis without hematuria        Labs and Imaging:  Results for orders placed or performed during the hospital encounter of 04/24/22   UA with Microscopic reflex to Culture    Specimen: Urine, Midstream   Result Value Ref Range    Color Urine Pink (A) Colorless, Straw, Light Yellow, Yellow    Appearance Urine Turbid (A) Clear    Glucose Urine Negative Negative mg/dL    Bilirubin Urine Negative Negative    Ketones Urine Negative Negative mg/dL    Specific Gravity Urine 1.003 1.001 - 1.030    Blood Urine >1.0 mg/dL (A) Negative    pH Urine 6.5 5.0 - 7.0    Protein Albumin Urine 100  (A) Negative mg/dL    Urobilinogen Urine <2.0 <2.0 mg/dL    Nitrite Urine Negative Negative    Leukocyte Esterase Urine 500 Jerzy/uL (A) Negative    WBC Clumps Urine Present (A) None Seen /HPF    RBC Urine 2 <=2 /HPF    WBC Urine 128 (H) <=5 /HPF    Squamous Epithelials Urine 1 <=1 /HPF   HCG qualitative urine   Result Value Ref Range    hCG Urine Qualitative Negative Negative     I have reviewed the relevant laboratory and radiology studies      Mendez Mosqueda M.D.  Emergency Medicine  [facility] EMERGENCY ROOM  [facility] [facility] [id number] [phone]  Dept: [phone]      Mendez Mosqueda MD  04/24/22 0713

## 2022-04-24 NOTE — DISCHARGE INSTRUCTIONS
You were seen here in the ED for evaluation of urinary frequency, cystitis.  Your urinary sample here is consistent with infection.  We will discharge you home on antibiotics to treat this infection.  Please take the entire course of antibiotics.  If you develop severe worsening symptoms, high fever and weakness, or other immediate concern please return to the ED.  For pain you may follow the recommendations below.  Recommend that you follow-up with your primary care in to review any ongoing symptoms     The antibiotic that we will discharge you with is called Ciprofloxacin 500mg. Take 1 tablet by mouth 2 times daily for 5 days. Please take the entire course of antibiotics even if you are feeling better after a few days.     For pain or fever you may use:  -Tylenol 650 mg every 6 hours.  Max 4000 mg in 24 hours  Do not use thismedication with alcohol as it can cause liver problems.  -Ibuprofen 600 mg every 6 hours.  Max 3500 mg in 24 hours  Do not take this medication if you have a history of a GI bleed or have kidney problems.  You may use both of these medications at the same time or you can alternate them every 3 hours.  For example, Tylenol at 6 AM, ibuprofen at 9 AM, Tylenol at noon, etc.

## 2022-04-24 NOTE — ED TRIAGE NOTES
Pt arrives with c/o dysuria, frequency, and hematuria. Pain currently 10/10. Pt appears in pain. VSS.

## 2022-04-25 LAB — BACTERIA UR CULT: ABNORMAL

## 2022-07-12 ENCOUNTER — APPOINTMENT (OUTPATIENT)
Dept: CT IMAGING | Facility: CLINIC | Age: 52
End: 2022-07-12
Attending: EMERGENCY MEDICINE
Payer: MEDICARE

## 2022-07-12 ENCOUNTER — HOSPITAL ENCOUNTER (EMERGENCY)
Facility: CLINIC | Age: 52
Discharge: HOME | End: 2022-07-12
Attending: EMERGENCY MEDICINE | Admitting: EMERGENCY MEDICINE
Payer: MEDICARE

## 2022-07-12 VITALS
TEMPERATURE: 98.6 F | OXYGEN SATURATION: 97 % | DIASTOLIC BLOOD PRESSURE: 76 MMHG | BODY MASS INDEX: 27.49 KG/M2 | WEIGHT: 165 LBS | HEIGHT: 65 IN | HEART RATE: 86 BPM | RESPIRATION RATE: 16 BRPM | SYSTOLIC BLOOD PRESSURE: 133 MMHG

## 2022-07-12 DIAGNOSIS — K43.9 ABDOMINAL WALL HERNIA: ICD-10-CM

## 2022-07-12 LAB
ALBUMIN SERPL-MCNC: 3.5 G/DL (ref 3.5–5)
ALBUMIN UR-MCNC: NEGATIVE MG/DL
ALP SERPL-CCNC: 94 U/L (ref 45–120)
ALT SERPL W P-5'-P-CCNC: 33 U/L (ref 0–45)
ANION GAP SERPL CALCULATED.3IONS-SCNC: 10 MMOL/L (ref 5–18)
APPEARANCE UR: CLEAR
AST SERPL W P-5'-P-CCNC: 36 U/L (ref 0–40)
BASOPHILS # BLD AUTO: 0 10E3/UL (ref 0–0.2)
BASOPHILS NFR BLD AUTO: 0 %
BILIRUB SERPL-MCNC: 0.4 MG/DL (ref 0–1)
BILIRUB UR QL STRIP: NEGATIVE
BUN SERPL-MCNC: 6 MG/DL (ref 8–22)
CALCIUM SERPL-MCNC: 9.5 MG/DL (ref 8.5–10.5)
CHLORIDE BLD-SCNC: 108 MMOL/L (ref 98–107)
CO2 SERPL-SCNC: 21 MMOL/L (ref 22–31)
COLOR UR AUTO: COLORLESS
CREAT SERPL-MCNC: 0.66 MG/DL (ref 0.6–1.1)
EOSINOPHIL # BLD AUTO: 0.1 10E3/UL (ref 0–0.7)
EOSINOPHIL NFR BLD AUTO: 1 %
ERYTHROCYTE [DISTWIDTH] IN BLOOD BY AUTOMATED COUNT: 13.2 % (ref 10–15)
GFR SERPL CREATININE-BSD FRML MDRD: >90 ML/MIN/1.73M2
GLUCOSE BLD-MCNC: 107 MG/DL (ref 70–125)
GLUCOSE UR STRIP-MCNC: NEGATIVE MG/DL
HCT VFR BLD AUTO: 48.4 % (ref 35–47)
HGB BLD-MCNC: 16.5 G/DL (ref 11.7–15.7)
HGB UR QL STRIP: NEGATIVE
IMM GRANULOCYTES # BLD: 0 10E3/UL
IMM GRANULOCYTES NFR BLD: 1 %
KETONES UR STRIP-MCNC: NEGATIVE MG/DL
LEUKOCYTE ESTERASE UR QL STRIP: NEGATIVE
LYMPHOCYTES # BLD AUTO: 2.8 10E3/UL (ref 0.8–5.3)
LYMPHOCYTES NFR BLD AUTO: 45 %
MCH RBC QN AUTO: 30.7 PG (ref 26.5–33)
MCHC RBC AUTO-ENTMCNC: 34.1 G/DL (ref 31.5–36.5)
MCV RBC AUTO: 90 FL (ref 78–100)
MONOCYTES # BLD AUTO: 0.5 10E3/UL (ref 0–1.3)
MONOCYTES NFR BLD AUTO: 7 %
NEUTROPHILS # BLD AUTO: 2.8 10E3/UL (ref 1.6–8.3)
NEUTROPHILS NFR BLD AUTO: 46 %
NITRATE UR QL: NEGATIVE
NRBC # BLD AUTO: 0 10E3/UL
NRBC BLD AUTO-RTO: 0 /100
PH UR STRIP: 6.5 [PH] (ref 5–7)
PLATELET # BLD AUTO: 307 10E3/UL (ref 150–450)
POTASSIUM BLD-SCNC: 4.1 MMOL/L (ref 3.5–5)
PROT SERPL-MCNC: 6.9 G/DL (ref 6–8)
RBC # BLD AUTO: 5.37 10E6/UL (ref 3.8–5.2)
RBC URINE: 0 /HPF
SODIUM SERPL-SCNC: 139 MMOL/L (ref 136–145)
SP GR UR STRIP: 1 (ref 1–1.03)
SQUAMOUS EPITHELIAL: <1 /HPF
UROBILINOGEN UR STRIP-MCNC: <2 MG/DL
WBC # BLD AUTO: 6.2 10E3/UL (ref 4–11)
WBC URINE: <1 /HPF

## 2022-07-12 PROCEDURE — 250N000011 HC RX IP 250 OP 636: Performed by: EMERGENCY MEDICINE

## 2022-07-12 PROCEDURE — 96361 HYDRATE IV INFUSION ADD-ON: CPT

## 2022-07-12 PROCEDURE — 80053 COMPREHEN METABOLIC PANEL: CPT | Performed by: EMERGENCY MEDICINE

## 2022-07-12 PROCEDURE — G1010 CDSM STANSON: HCPCS

## 2022-07-12 PROCEDURE — 258N000003 HC RX IP 258 OP 636: Performed by: EMERGENCY MEDICINE

## 2022-07-12 PROCEDURE — 81001 URINALYSIS AUTO W/SCOPE: CPT | Performed by: EMERGENCY MEDICINE

## 2022-07-12 PROCEDURE — 36415 COLL VENOUS BLD VENIPUNCTURE: CPT | Performed by: EMERGENCY MEDICINE

## 2022-07-12 PROCEDURE — 96374 THER/PROPH/DIAG INJ IV PUSH: CPT

## 2022-07-12 PROCEDURE — 99285 EMERGENCY DEPT VISIT HI MDM: CPT | Mod: 25

## 2022-07-12 PROCEDURE — 85025 COMPLETE CBC W/AUTO DIFF WBC: CPT | Performed by: EMERGENCY MEDICINE

## 2022-07-12 PROCEDURE — 96375 TX/PRO/DX INJ NEW DRUG ADDON: CPT

## 2022-07-12 RX ORDER — SODIUM CHLORIDE 9 MG/ML
INJECTION, SOLUTION INTRAVENOUS CONTINUOUS
Status: DISCONTINUED | OUTPATIENT
Start: 2022-07-12 | End: 2022-07-12 | Stop reason: HOSPADM

## 2022-07-12 RX ORDER — ONDANSETRON 2 MG/ML
4 INJECTION INTRAMUSCULAR; INTRAVENOUS EVERY 30 MIN PRN
Status: DISCONTINUED | OUTPATIENT
Start: 2022-07-12 | End: 2022-07-12 | Stop reason: HOSPADM

## 2022-07-12 RX ORDER — HYDROMORPHONE HYDROCHLORIDE 1 MG/ML
0.5 INJECTION, SOLUTION INTRAMUSCULAR; INTRAVENOUS; SUBCUTANEOUS
Status: COMPLETED | OUTPATIENT
Start: 2022-07-12 | End: 2022-07-12

## 2022-07-12 RX ADMIN — SODIUM CHLORIDE 1000 ML: 9 INJECTION, SOLUTION INTRAVENOUS at 01:37

## 2022-07-12 RX ADMIN — ONDANSETRON 4 MG: 2 INJECTION INTRAMUSCULAR; INTRAVENOUS at 01:37

## 2022-07-12 RX ADMIN — HYDROMORPHONE HYDROCHLORIDE 0.5 MG: 1 INJECTION, SOLUTION INTRAMUSCULAR; INTRAVENOUS; SUBCUTANEOUS at 01:37

## 2022-07-12 ASSESSMENT — ENCOUNTER SYMPTOMS
CONSTIPATION: 0
BLOOD IN STOOL: 0
FREQUENCY: 0
NUMBNESS: 0
FEVER: 0
WEAKNESS: 0
BACK PAIN: 1
HEMATURIA: 0
DIFFICULTY URINATING: 0

## 2022-07-12 NOTE — ED TRIAGE NOTES
PM GROUP/LEISURE INTERESTS
PT ATTENDED AND PARTICIPATED IN ALL GROUP ACTIVITIES. PT WORKED WITH A PEER TO
PUT A PUZZLE TOGETHER. PT BEGAN BY STATING,"I DON'T KNOW IF I CAN DO IT, I'M
REALLY NERVOUS." PT WAS ENCOURAGED AND ONCE PT WAS BUSY, ALL ANXIETY
DISAPPEARED. PT reports bilateral flank pain with nausea and vomiting x one month. Pt denies other urinary symptoms     Triage Assessment     Row Name 07/12/22 0025       Triage Assessment (Adult)    Airway WDL WDL       Respiratory WDL    Respiratory WDL WDL       Skin Circulation/Temperature WDL    Skin Circulation/Temperature WDL WDL       Cardiac WDL    Cardiac WDL WDL       Peripheral/Neurovascular WDL    Peripheral Neurovascular WDL WDL       Cognitive/Neuro/Behavioral WDL    Cognitive/Neuro/Behavioral WDL WDL

## 2022-07-12 NOTE — ED PROVIDER NOTES
EMERGENCY DEPARTMENT ENCOUNTER      NAME: Fallon Sandhu  AGE: 51 year old female  YOB: 1970  MRN: [id number]  EVALUATION DATE & TIME: 7/12/2022 12:29 AM    PCP: System, Provider Not In    ED PROVIDER: Corby Hayden M.D.      Chief Complaint   Patient presents with     Flank Pain         FINAL IMPRESSION:  1. Abdominal wall hernia          ED COURSE & MEDICAL DECISION MAKING:    Pertinent Labs & Imaging studies reviewed. (See chart for details)  51 year old female presents to the Emergency Department for evaluation of who presents with abdominal pain.  This is a for bilateral flanks.  Radiates down towards her groin.  Urinalysis does not show any signs of infection.  White count is normal.  Hemoglobin slightly elevated at 16.5 so may be somewhat contracted.  Given IV fluids.  Also received 1 dose of IV Dilaudid with improvement of her symptoms.  CT scan of the pelvis does not show any signs of kidney stone.  No obstruction.  No appendicitis.  Does have fat-containing midline abdominal hernias which could be causing some of her symptoms.  There is no bowel contained in these.  Discussed this finding with the patient.  Will refer to surgery.  We will have her return for worsening symptoms    12:55 AM I met with the patient to gather history and to perform my initial exam. I discussed the plan for care while in the Emergency Department. PPE: Surgical mask and eye glasses.     At the conclusion of the encounter I discussed the results of all of the tests and the disposition. The questions were answered. The patient or family acknowledged understanding and was agreeable with the care plan.         MEDICATIONS GIVEN IN THE EMERGENCY:  Medications   0.9% sodium chloride BOLUS (0 mLs Intravenous Stopped 7/12/22 0246)   HYDROmorphone (PF) (DILAUDID) injection 0.5 mg (0.5 mg Intravenous Given 7/12/22 0137)       NEW PRESCRIPTIONS STARTED AT TODAY'S ER VISIT  Discharge Medication List as of 7/12/2022  3:15  AM             =================================================================    HPI    Patient information was obtained from: Patient    Use of : N/A         Fallon Sandhu is a 51 year old female with a pertinent history of bipolar 1, depression, chronic pain syndrome, and cannabis use who presents to this ED via walk-in for evaluation of flank pain.     Patient reports one month of constant back pain that radiates into her groin bilaterally. This pain has gotten gradually use. She notes she has started to vomit since . Patient has taken tylenol but found little relief. Patient does have a history of C-sections.     Denies fever, urinary symptoms, changes in stool, weakness, and numbness.       REVIEW OF SYSTEMS   Review of Systems   Constitutional: Negative for fever.   Gastrointestinal: Negative for blood in stool and constipation.   Genitourinary: Negative for decreased urine volume, difficulty urinating, enuresis, frequency, hematuria and urgency.   Musculoskeletal: Positive for back pain.        Positive for groin pain.    Neurological: Negative for weakness and numbness.   All other systems reviewed and are negative.      PAST MEDICAL HISTORY:  Past Medical History:   Diagnosis Date     Asthma      Depression      Migraines      Miscarriage      Pneumonia        PAST SURGICAL HISTORY:  Past Surgical History:   Procedure Laterality Date     APPENDECTOMY        SECTION      5x     CHOLECYSTECTOMY       RIGHT OOPHORECTOMY             CURRENT MEDICATIONS:    No current facility-administered medications for this encounter.     Current Outpatient Medications   Medication     acetaminophen (TYLENOL EXTRA STRENGTH) 500 MG tablet     doxylamine (UNISON) 25 mg tablet     ibuprofen (ADVIL,MOTRIN) 600 MG tablet     omeprazole (PRILOSEC) 20 MG capsule     ondansetron (ZOFRAN ODT) 4 MG ODT tab     ondansetron (ZOFRAN) 4 MG tablet     QUEtiapine (SEROQUEL) 200 MG tablet     traMADol (ULTRAM) 50  "mg tablet         ALLERGIES:  Allergies   Allergen Reactions     Ibuprofen Itching       FAMILY HISTORY:  Family History   Problem Relation Age of Onset     Hypertension Mother      Diabetes Mother      Hypertension Father      Diabetes Father        SOCIAL HISTORY:   Social History     Socioeconomic History     Marital status: Single   Tobacco Use     Smoking status: Current Every Day Smoker     Packs/day: 0.50     Years: 10.00     Pack years: 5.00     Smokeless tobacco: Never Used   Substance and Sexual Activity     Alcohol use: No     Drug use: Yes     Types: Marijuana       VITALS:  /76   Pulse 86   Temp 98.6  F (37  C) (Oral)   Resp 16   Ht 1.651 m (5' 5\")   Wt 74.8 kg (165 lb)   SpO2 97%   BMI 27.46 kg/m      PHYSICAL EXAM    Physical Exam  Constitutional:       General: She is not in acute distress.     Appearance: She is not diaphoretic.   HENT:      Head: Atraumatic.      Mouth/Throat:      Pharynx: No oropharyngeal exudate.   Eyes:      General: No scleral icterus.     Pupils: Pupils are equal, round, and reactive to light.   Cardiovascular:      Heart sounds: Normal heart sounds.   Pulmonary:      Effort: No respiratory distress.      Breath sounds: Normal breath sounds.   Abdominal:      Palpations: Abdomen is soft.      Tenderness: There is no abdominal tenderness. There is no guarding or rebound.   Musculoskeletal:         General: No tenderness.   Skin:     General: Skin is warm.      Findings: No rash.   Neurological:      General: No focal deficit present.      Mental Status: She is alert.           LAB:  All pertinent labs reviewed and interpreted.  Labs Ordered and Resulted from Time of ED Arrival to Time of ED Departure   COMPREHENSIVE METABOLIC PANEL - Abnormal       Result Value    Sodium 139      Potassium 4.1      Chloride 108 (*)     Carbon Dioxide (CO2) 21 (*)     Anion Gap 10      Urea Nitrogen 6 (*)     Creatinine 0.66      Calcium 9.5      Glucose 107      Alkaline " Phosphatase 94      AST 36      ALT 33      Protein Total 6.9      Albumin 3.5      Bilirubin Total 0.4      GFR Estimate >90     CBC WITH PLATELETS AND DIFFERENTIAL - Abnormal    WBC Count 6.2      RBC Count 5.37 (*)     Hemoglobin 16.5 (*)     Hematocrit 48.4 (*)     MCV 90      MCH 30.7      MCHC 34.1      RDW 13.2      Platelet Count 307      % Neutrophils 46      % Lymphocytes 45      % Monocytes 7      % Eosinophils 1      % Basophils 0      % Immature Granulocytes 1      NRBCs per 100 WBC 0      Absolute Neutrophils 2.8      Absolute Lymphocytes 2.8      Absolute Monocytes 0.5      Absolute Eosinophils 0.1      Absolute Basophils 0.0      Absolute Immature Granulocytes 0.0      Absolute NRBCs 0.0     ROUTINE UA WITH MICROSCOPIC REFLEX TO CULTURE - Normal    Color Urine Colorless      Appearance Urine Clear      Glucose Urine Negative      Bilirubin Urine Negative      Ketones Urine Negative      Specific Gravity Urine 1.004      Blood Urine Negative      pH Urine 6.5      Protein Albumin Urine Negative      Urobilinogen Urine <2.0      Nitrite Urine Negative      Leukocyte Esterase Urine Negative      RBC Urine 0      WBC Urine <1      Squamous Epithelials Urine <1         RADIOLOGY:  Reviewed all pertinent imaging. Please see official radiology report.  CT Abdomen Pelvis w/o Contrast   Final Result   IMPRESSION:    1.  Fat-containing midline abdominal hernias near level the umbilicus as detailed above. No bowel-containing hernias.      2.  No urinary collecting system dilatation or calculi. 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 No hydronephrosis involving either moiety.      3.  Marked hepatic steatosis.               I, Faye Ortega, am serving as a scribe to document services personally performed by Dr. Corby Hayden, based on my observation and the provider's statements to me. I, Corby Hayden MD attest that Faye Ortega is acting in a scribe capacity, has observed my  performance of the services and has documented them in accordance with my direction.    Corby Hayden M.D.  Emergency Medicine  [facility] EMERGENCY ROOM  [address] [facility] [address]  [phone]  Dept: [phone]       Corby Hayden MD  07/12/22 0605

## 2023-02-24 ENCOUNTER — HOSPITAL ENCOUNTER (EMERGENCY)
Facility: CLINIC | Age: 53
Discharge: HOME HEALTH | End: 2023-02-24
Attending: EMERGENCY MEDICINE | Admitting: EMERGENCY MEDICINE
Payer: MEDICARE

## 2023-02-24 VITALS
HEART RATE: 83 BPM | WEIGHT: 165 LBS | SYSTOLIC BLOOD PRESSURE: 145 MMHG | RESPIRATION RATE: 20 BRPM | OXYGEN SATURATION: 97 % | BODY MASS INDEX: 27.46 KG/M2 | DIASTOLIC BLOOD PRESSURE: 91 MMHG | TEMPERATURE: 97.4 F

## 2023-02-24 DIAGNOSIS — K13.79 MOUTH SORES: ICD-10-CM

## 2023-02-24 PROBLEM — L28.2 PRURITIC RASH: Status: ACTIVE | Noted: 2019-01-29

## 2023-02-24 PROBLEM — Q60.2 CONGENITAL ABSENCE OF KIDNEY: Status: ACTIVE | Noted: 2019-01-29

## 2023-02-24 PROBLEM — F17.200 TOBACCO USE DISORDER, MODERATE, DEPENDENCE: Status: ACTIVE | Noted: 2019-07-08

## 2023-02-24 PROBLEM — F51.5 NIGHTMARES: Status: ACTIVE | Noted: 2022-01-25

## 2023-02-24 LAB
ANION GAP SERPL CALCULATED.3IONS-SCNC: 10 MMOL/L (ref 5–18)
BASOPHILS # BLD AUTO: 0 10E3/UL (ref 0–0.2)
BASOPHILS NFR BLD AUTO: 0 %
BUN SERPL-MCNC: 8 MG/DL (ref 8–22)
CALCIUM SERPL-MCNC: 8.9 MG/DL (ref 8.5–10.5)
CHLORIDE BLD-SCNC: 108 MMOL/L (ref 98–107)
CO2 SERPL-SCNC: 21 MMOL/L (ref 22–31)
CREAT SERPL-MCNC: 0.65 MG/DL (ref 0.6–1.1)
EOSINOPHIL # BLD AUTO: 0.1 10E3/UL (ref 0–0.7)
EOSINOPHIL NFR BLD AUTO: 1 %
ERYTHROCYTE [DISTWIDTH] IN BLOOD BY AUTOMATED COUNT: 12.9 % (ref 10–15)
FLUAV RNA SPEC QL NAA+PROBE: NEGATIVE
FLUBV RNA RESP QL NAA+PROBE: NEGATIVE
GFR SERPL CREATININE-BSD FRML MDRD: >90 ML/MIN/1.73M2
GLUCOSE BLD-MCNC: 93 MG/DL (ref 70–125)
HCT VFR BLD AUTO: 44.4 % (ref 35–47)
HGB BLD-MCNC: 15.2 G/DL (ref 11.7–15.7)
IMM GRANULOCYTES # BLD: 0.1 10E3/UL
IMM GRANULOCYTES NFR BLD: 1 %
LYMPHOCYTES # BLD AUTO: 2.9 10E3/UL (ref 0.8–5.3)
LYMPHOCYTES NFR BLD AUTO: 41 %
MCH RBC QN AUTO: 31.9 PG (ref 26.5–33)
MCHC RBC AUTO-ENTMCNC: 34.2 G/DL (ref 31.5–36.5)
MCV RBC AUTO: 93 FL (ref 78–100)
MONOCYTES # BLD AUTO: 0.7 10E3/UL (ref 0–1.3)
MONOCYTES NFR BLD AUTO: 10 %
NEUTROPHILS # BLD AUTO: 3.3 10E3/UL (ref 1.6–8.3)
NEUTROPHILS NFR BLD AUTO: 47 %
NRBC # BLD AUTO: 0 10E3/UL
NRBC BLD AUTO-RTO: 0 /100
PLATELET # BLD AUTO: 279 10E3/UL (ref 150–450)
POTASSIUM BLD-SCNC: 4 MMOL/L (ref 3.5–5)
RBC # BLD AUTO: 4.77 10E6/UL (ref 3.8–5.2)
RSV RNA SPEC NAA+PROBE: NEGATIVE
SARS-COV-2 RNA RESP QL NAA+PROBE: NEGATIVE
SODIUM SERPL-SCNC: 139 MMOL/L (ref 136–145)
WBC # BLD AUTO: 7 10E3/UL (ref 4–11)

## 2023-02-24 PROCEDURE — 99284 EMERGENCY DEPT VISIT MOD MDM: CPT | Mod: 25,CS

## 2023-02-24 PROCEDURE — 36415 COLL VENOUS BLD VENIPUNCTURE: CPT | Performed by: EMERGENCY MEDICINE

## 2023-02-24 PROCEDURE — 87637 SARSCOV2&INF A&B&RSV AMP PRB: CPT | Performed by: EMERGENCY MEDICINE

## 2023-02-24 PROCEDURE — 258N000003 HC RX IP 258 OP 636: Performed by: EMERGENCY MEDICINE

## 2023-02-24 PROCEDURE — 96374 THER/PROPH/DIAG INJ IV PUSH: CPT

## 2023-02-24 PROCEDURE — 96361 HYDRATE IV INFUSION ADD-ON: CPT

## 2023-02-24 PROCEDURE — 80048 BASIC METABOLIC PNL TOTAL CA: CPT | Performed by: EMERGENCY MEDICINE

## 2023-02-24 PROCEDURE — 250N000013 HC RX MED GY IP 250 OP 250 PS 637: Performed by: EMERGENCY MEDICINE

## 2023-02-24 PROCEDURE — 250N000011 HC RX IP 250 OP 636: Performed by: EMERGENCY MEDICINE

## 2023-02-24 PROCEDURE — 85025 COMPLETE CBC W/AUTO DIFF WBC: CPT | Performed by: EMERGENCY MEDICINE

## 2023-02-24 RX ORDER — ONDANSETRON 2 MG/ML
4 INJECTION INTRAMUSCULAR; INTRAVENOUS ONCE
Status: COMPLETED | OUTPATIENT
Start: 2023-02-24 | End: 2023-02-24

## 2023-02-24 RX ORDER — CHLORHEXIDINE GLUCONATE ORAL RINSE 1.2 MG/ML
15 SOLUTION DENTAL 2 TIMES DAILY
Qty: 210 ML | Refills: 0 | Status: SHIPPED | OUTPATIENT
Start: 2023-02-24 | End: 2023-03-03

## 2023-02-24 RX ORDER — ACETAMINOPHEN 325 MG/1
650 TABLET ORAL ONCE
Status: COMPLETED | OUTPATIENT
Start: 2023-02-24 | End: 2023-02-24

## 2023-02-24 RX ADMIN — ACETAMINOPHEN 650 MG: 325 TABLET ORAL at 18:08

## 2023-02-24 RX ADMIN — ONDANSETRON 4 MG: 2 INJECTION INTRAMUSCULAR; INTRAVENOUS at 18:08

## 2023-02-24 RX ADMIN — SODIUM CHLORIDE 1000 ML: 9 INJECTION, SOLUTION INTRAVENOUS at 18:02

## 2023-02-24 ASSESSMENT — ACTIVITIES OF DAILY LIVING (ADL): ADLS_ACUITY_SCORE: 35

## 2023-02-24 NOTE — ED PROVIDER NOTES
EMERGENCY DEPARTMENT ENCOUNTER      NAME: Fallon Sandhu  AGE: 52 year old female  YOB: 1970  MRN: [id number]  EVALUATION DATE & TIME: No admission date for patient encounter.    PCP: System, Provider Not In    ED PROVIDER: Mendez Mosqueda M.D.      Chief Complaint   Patient presents with     Generalized Weakness     Nausea     Mouth Problem       FINAL IMPRESSION:  1. Mouth sores        ED COURSE & MEDICAL DECISION MAKIN year old female presents to the Emergency Department for evaluation of mouth sores generalized weakness and fatigue. He is vitally stable and well-appearing when he arrives to the emergency department.  She has some sores on her tongue and mouth which appear consistent with a viral enanthem, possibly aphthous ulcers.  There is no severe swelling or fluctuance, floor the mouth is soft, no trismus.  She is otherwise systemically nontoxic-appearing.  Cardiopulmonary and abdominal exams are unremarkable.  She is ambulatory.  She underwent basic lab testing here which all appear stable and reassuring including normal white blood cell count and stable hemoglobin and stable metabolic profile.  She received some IV fluids here and was resting comfortably on recheck.  We discussed a supportive treatment plan for her mouth sores including Peridex and Orajel in addition to continuing Tylenol.  She was advised to follow-up in clinic particularly if symptoms or not improving within the next week or two.  Patient was discharged in good condition.    4:58 PM I met with the patient to gather history and to perform my initial exam. I discussed the plan for care while in the Emergency Department.   8:00 PM I discussed the plan for discharge with the patient, and patient is agreeable.  We discussed supportive cares at home and reasons for return to the ER including new or worsening symptoms - all questions and concerns addressed.  Patient to be discharged by RN.       Medical Decision  Making    History:    Supplemental history from: N/A    External Record(s) reviewed: Documented in chart, if applicable.    Work Up:    Chart documentation includes differential considered and any EKGs or imaging independently interpreted by provider, where specified.    In additional to work up documented, I considered the following work up: Documented in chart, if applicable.    External consultation:    Discussion of management with another provider: Documented in chart, if applicable    Complicating factors:    Care impacted by chronic illness: N/A    Care affected by social determinants of health: N/A    Disposition considerations: Discharge. I prescribed additional prescription strength medication(s) as charted. See documentation for any additional details.            MEDICATIONS GIVEN IN THE EMERGENCY:  Medications   0.9% sodium chloride BOLUS (0 mLs Intravenous Stopped 2/24/23 2008)   ondansetron (ZOFRAN) injection 4 mg (4 mg Intravenous $Given 2/24/23 1808)   acetaminophen (TYLENOL) tablet 650 mg (650 mg Oral $Given 2/24/23 1808)       NEW PRESCRIPTIONS STARTED AT TODAY'S ER VISIT  Discharge Medication List as of 2/24/2023  8:11 PM      START taking these medications    Details   benzocaine (ANBESOL) 10 % gel Take by mouth 4 times daily as needed for mouth soresDisp-9 g, R-0Local Print      chlorhexidine (PERIDEX) 0.12 % solution Swish and spit 15 mLs in mouth 2 times daily for 7 days, Disp-210 mL, R-0, Local Print                =================================================================    HPI    Patient information was obtained from: Patient    Use of : N/A         Fallon Sandhu is a 52 year old female with a pertinent history of asthma who presents to this ED by walk-in for evaluation of weakness.    Patient endorses 2 1/2 weeks of generalized weakness, fatigue, and nausea. She reports 4 days of mouth pain and a tongue rash. Patient denies any recent medication changes. Patient  denies any other body rashes, fever, or any other complaints at this time.     REVIEW OF SYSTEMS   All systems reviewed and negative except as noted in HPI.    PAST MEDICAL HISTORY:  Past Medical History:   Diagnosis Date     Asthma      Depression      Migraines      Miscarriage      Pneumonia        PAST SURGICAL HISTORY:  Past Surgical History:   Procedure Laterality Date     APPENDECTOMY        SECTION      5x     CHOLECYSTECTOMY       RIGHT OOPHORECTOMY             CURRENT MEDICATIONS:    No current facility-administered medications for this encounter.     Current Outpatient Medications   Medication     benzocaine (ANBESOL) 10 % gel     chlorhexidine (PERIDEX) 0.12 % solution     acetaminophen (TYLENOL EXTRA STRENGTH) 500 MG tablet     doxylamine (UNISON) 25 mg tablet     ibuprofen (ADVIL,MOTRIN) 600 MG tablet     omeprazole (PRILOSEC) 20 MG capsule     ondansetron (ZOFRAN ODT) 4 MG ODT tab     ondansetron (ZOFRAN) 4 MG tablet     QUEtiapine (SEROQUEL) 200 MG tablet     traMADol (ULTRAM) 50 mg tablet         ALLERGIES:  Allergies   Allergen Reactions     Ibuprofen Itching       FAMILY HISTORY:  Family History   Problem Relation Age of Onset     Hypertension Mother      Diabetes Mother      Hypertension Father      Diabetes Father        SOCIAL HISTORY:   Social History     Socioeconomic History     Marital status: Single   Tobacco Use     Smoking status: Every Day     Packs/day: 0.50     Years: 10.00     Pack years: 5.00     Types: Cigarettes     Smokeless tobacco: Never   Substance and Sexual Activity     Alcohol use: No     Drug use: Yes     Types: Marijuana       VITALS:  BP (!) 145/91   Pulse 83   Temp 97.4  F (36.3  C) (Temporal)   Resp 20   Wt 74.8 kg (165 lb)   SpO2 97%   BMI 27.46 kg/m      PHYSICAL EXAM    Constitutional: Well developed, Well nourished, NAD.  HENT: Normocephalic, Atraumatic. Neck Supple.  There are several flat ulcerated lesions on the tip of the tongue and the gingiva.   No trismus.  Floor the mouth is soft.  No fluctuance.  Posterior oropharynx is clear.  Neck is supple.  Eyes: EOMI, Conjunctiva normal.  Respiratory: Breathing comfortably on room air. Speaks full sentences easily. Lungs clear to ascultation.  Cardiovascular: Normal heart rate, Regular rhythm. No peripheral edema.  Abdomen: Soft  Musculoskeletal: Good range of motion in all major joints. No major deformities noted.  Integument: Warm, Dry.  Neurologic: Alert & awake, Normal motor function, Normal sensory function, No focal deficits noted.   Psychiatric: Cooperative. Affect appropriate.     LAB:  All pertinent labs reviewed and interpreted.  Labs Ordered and Resulted from Time of ED Arrival to Time of ED Departure   BASIC METABOLIC PANEL - Abnormal       Result Value    Sodium 139      Potassium 4.0      Chloride 108 (*)     Carbon Dioxide (CO2) 21 (*)     Anion Gap 10      Urea Nitrogen 8      Creatinine 0.65      Calcium 8.9      Glucose 93      GFR Estimate >90     INFLUENZA A/B & SARS-COV2 PCR MULTIPLEX - Normal    Influenza A PCR Negative      Influenza B PCR Negative      RSV PCR Negative      SARS CoV2 PCR Negative     CBC WITH PLATELETS AND DIFFERENTIAL    WBC Count 7.0      RBC Count 4.77      Hemoglobin 15.2      Hematocrit 44.4      MCV 93      MCH 31.9      MCHC 34.2      RDW 12.9      Platelet Count 279      % Neutrophils 47      % Lymphocytes 41      % Monocytes 10      % Eosinophils 1      % Basophils 0      % Immature Granulocytes 1      NRBCs per 100 WBC 0      Absolute Neutrophils 3.3      Absolute Lymphocytes 2.9      Absolute Monocytes 0.7      Absolute Eosinophils 0.1      Absolute Basophils 0.0      Absolute Immature Granulocytes 0.1      Absolute NRBCs 0.0           I, Anastacia Allen, am serving as a scribe to document services personally performed by Dr. Mendez Mosqueda, based on my observation and the provider's statements to me. IMendez MD attest that Anastacia Allen is acting in a  [facility], has observed my performance of the services and has documented them in accordance with my direction.    Mendez Mosqueda M.D.  Emergency Medicine  [facility] EMERGENCY ROOM  [address] [id number]  [phone]  Dept: [phone]     Mendez Mosqueda MD  02/24/23 0163

## 2023-02-24 NOTE — ED TRIAGE NOTES
Pt presents to the ED with c/o generalized weakness, tongue pain d/t rash. Pt reports that the rash on tongue has been there for roughly 3 days, endorses pain with it. Pt reports weakness has been going on for a few weeks.      Triage Assessment     Row Name 02/24/23 8579       Triage Assessment (Adult)    Airway WDL WDL       Respiratory WDL    Respiratory WDL WDL       Skin Circulation/Temperature WDL    Skin Circulation/Temperature WDL WDL       Cardiac WDL    Cardiac WDL WDL       Peripheral/Neurovascular WDL    Peripheral Neurovascular WDL WDL       Cognitive/Neuro/Behavioral WDL    Cognitive/Neuro/Behavioral WDL WDL

## 2023-02-25 NOTE — DISCHARGE INSTRUCTIONS
You were seen in the emergency department for mouth sores and generalized weakness and fatigue.  Your evaluation included lab testing which has all looked stable and reassuring so far.  We did not see any signs of severe systemic infection or inflammation.  You were treated with some IV fluid here.  We are going to recommend Peridex swish and spit twice a day for the next 7 days to help keep your mouth clean and prevent infection.  We also would recommend using benzocaine gel as needed for pain in your mouth from the sores.  We think these will be improving within the next few days.  Please follow-up in your clinic if you have any lingering concerns after this emergency department visit

## 2023-04-03 ENCOUNTER — HOSPITAL ENCOUNTER (EMERGENCY)
Facility: CLINIC | Age: 53
Discharge: HOME | End: 2023-04-03
Attending: STUDENT IN AN ORGANIZED HEALTH CARE EDUCATION/TRAINING PROGRAM | Admitting: STUDENT IN AN ORGANIZED HEALTH CARE EDUCATION/TRAINING PROGRAM
Payer: MEDICARE

## 2023-04-03 ENCOUNTER — APPOINTMENT (OUTPATIENT)
Dept: CT IMAGING | Facility: CLINIC | Age: 53
End: 2023-04-03
Attending: STUDENT IN AN ORGANIZED HEALTH CARE EDUCATION/TRAINING PROGRAM
Payer: MEDICARE

## 2023-04-03 ENCOUNTER — APPOINTMENT (OUTPATIENT)
Dept: RADIOLOGY | Facility: CLINIC | Age: 53
End: 2023-04-03
Attending: STUDENT IN AN ORGANIZED HEALTH CARE EDUCATION/TRAINING PROGRAM
Payer: MEDICARE

## 2023-04-03 VITALS
SYSTOLIC BLOOD PRESSURE: 112 MMHG | BODY MASS INDEX: 30 KG/M2 | HEART RATE: 91 BPM | HEIGHT: 64 IN | DIASTOLIC BLOOD PRESSURE: 90 MMHG | TEMPERATURE: 98 F | RESPIRATION RATE: 19 BRPM | WEIGHT: 175.7 LBS

## 2023-04-03 DIAGNOSIS — M54.12 LEFT CERVICAL RADICULOPATHY: ICD-10-CM

## 2023-04-03 LAB
ANION GAP SERPL CALCULATED.3IONS-SCNC: 12 MMOL/L (ref 5–18)
BASOPHILS # BLD AUTO: 0 10E3/UL (ref 0–0.2)
BASOPHILS NFR BLD AUTO: 0 %
BUN SERPL-MCNC: 5 MG/DL (ref 8–22)
C REACTIVE PROTEIN LHE: 1 MG/DL (ref 0–?)
CALCIUM SERPL-MCNC: 9.4 MG/DL (ref 8.5–10.5)
CHLORIDE BLD-SCNC: 108 MMOL/L (ref 98–107)
CO2 SERPL-SCNC: 20 MMOL/L (ref 22–31)
CREAT SERPL-MCNC: 0.6 MG/DL (ref 0.6–1.1)
EOSINOPHIL # BLD AUTO: 0.1 10E3/UL (ref 0–0.7)
EOSINOPHIL NFR BLD AUTO: 1 %
ERYTHROCYTE [DISTWIDTH] IN BLOOD BY AUTOMATED COUNT: 12.9 % (ref 10–15)
ERYTHROCYTE [SEDIMENTATION RATE] IN BLOOD BY WESTERGREN METHOD: 15 MM/HR (ref 0–20)
GFR SERPL CREATININE-BSD FRML MDRD: >90 ML/MIN/1.73M2
GLUCOSE BLD-MCNC: 95 MG/DL (ref 70–125)
HCT VFR BLD AUTO: 42.9 % (ref 35–47)
HGB BLD-MCNC: 15 G/DL (ref 11.7–15.7)
IMM GRANULOCYTES # BLD: 0 10E3/UL
IMM GRANULOCYTES NFR BLD: 0 %
LYMPHOCYTES # BLD AUTO: 2.8 10E3/UL (ref 0.8–5.3)
LYMPHOCYTES NFR BLD AUTO: 49 %
MCH RBC QN AUTO: 31.8 PG (ref 26.5–33)
MCHC RBC AUTO-ENTMCNC: 35 G/DL (ref 31.5–36.5)
MCV RBC AUTO: 91 FL (ref 78–100)
MONOCYTES # BLD AUTO: 0.5 10E3/UL (ref 0–1.3)
MONOCYTES NFR BLD AUTO: 9 %
NEUTROPHILS # BLD AUTO: 2.4 10E3/UL (ref 1.6–8.3)
NEUTROPHILS NFR BLD AUTO: 41 %
NRBC # BLD AUTO: 0 10E3/UL
NRBC BLD AUTO-RTO: 0 /100
PLATELET # BLD AUTO: 283 10E3/UL (ref 150–450)
POTASSIUM BLD-SCNC: 3.9 MMOL/L (ref 3.5–5)
RBC # BLD AUTO: 4.72 10E6/UL (ref 3.8–5.2)
SODIUM SERPL-SCNC: 140 MMOL/L (ref 136–145)
URATE SERPL-MCNC: 3.6 MG/DL (ref 2–7.5)
WBC # BLD AUTO: 5.8 10E3/UL (ref 4–11)

## 2023-04-03 PROCEDURE — G1010 CDSM STANSON: HCPCS

## 2023-04-03 PROCEDURE — 86140 C-REACTIVE PROTEIN: CPT | Performed by: STUDENT IN AN ORGANIZED HEALTH CARE EDUCATION/TRAINING PROGRAM

## 2023-04-03 PROCEDURE — 99285 EMERGENCY DEPT VISIT HI MDM: CPT | Mod: 25

## 2023-04-03 PROCEDURE — 96374 THER/PROPH/DIAG INJ IV PUSH: CPT

## 2023-04-03 PROCEDURE — 85025 COMPLETE CBC W/AUTO DIFF WBC: CPT | Performed by: STUDENT IN AN ORGANIZED HEALTH CARE EDUCATION/TRAINING PROGRAM

## 2023-04-03 PROCEDURE — 96375 TX/PRO/DX INJ NEW DRUG ADDON: CPT

## 2023-04-03 PROCEDURE — 73030 X-RAY EXAM OF SHOULDER: CPT | Mod: LT

## 2023-04-03 PROCEDURE — 80048 BASIC METABOLIC PNL TOTAL CA: CPT | Performed by: STUDENT IN AN ORGANIZED HEALTH CARE EDUCATION/TRAINING PROGRAM

## 2023-04-03 PROCEDURE — 85652 RBC SED RATE AUTOMATED: CPT | Performed by: STUDENT IN AN ORGANIZED HEALTH CARE EDUCATION/TRAINING PROGRAM

## 2023-04-03 PROCEDURE — 36415 COLL VENOUS BLD VENIPUNCTURE: CPT | Performed by: STUDENT IN AN ORGANIZED HEALTH CARE EDUCATION/TRAINING PROGRAM

## 2023-04-03 PROCEDURE — 84550 ASSAY OF BLOOD/URIC ACID: CPT | Performed by: STUDENT IN AN ORGANIZED HEALTH CARE EDUCATION/TRAINING PROGRAM

## 2023-04-03 PROCEDURE — 250N000011 HC RX IP 250 OP 636: Performed by: STUDENT IN AN ORGANIZED HEALTH CARE EDUCATION/TRAINING PROGRAM

## 2023-04-03 RX ORDER — METHYLPREDNISOLONE SODIUM SUCCINATE 125 MG/2ML
80 INJECTION, POWDER, LYOPHILIZED, FOR SOLUTION INTRAMUSCULAR; INTRAVENOUS ONCE
Status: COMPLETED | OUTPATIENT
Start: 2023-04-03 | End: 2023-04-03

## 2023-04-03 RX ORDER — KETOROLAC TROMETHAMINE 15 MG/ML
15 INJECTION, SOLUTION INTRAMUSCULAR; INTRAVENOUS ONCE
Status: COMPLETED | OUTPATIENT
Start: 2023-04-03 | End: 2023-04-03

## 2023-04-03 RX ORDER — PREDNISONE 20 MG/1
40 TABLET ORAL DAILY
Qty: 10 TABLET | Refills: 0 | Status: SHIPPED | OUTPATIENT
Start: 2023-04-03 | End: 2023-04-08

## 2023-04-03 RX ORDER — OXYCODONE HYDROCHLORIDE 5 MG/1
5 TABLET ORAL EVERY 6 HOURS PRN
Qty: 12 TABLET | Refills: 0 | Status: SHIPPED | OUTPATIENT
Start: 2023-04-03 | End: 2023-04-06

## 2023-04-03 RX ADMIN — METHYLPREDNISOLONE SODIUM SUCCINATE 81.25 MG: 125 INJECTION, POWDER, FOR SOLUTION INTRAMUSCULAR; INTRAVENOUS at 16:48

## 2023-04-03 RX ADMIN — KETOROLAC TROMETHAMINE 15 MG: 15 INJECTION, SOLUTION INTRAMUSCULAR; INTRAVENOUS at 16:45

## 2023-04-03 ASSESSMENT — ACTIVITIES OF DAILY LIVING (ADL)
ADLS_ACUITY_SCORE: 35
ADLS_ACUITY_SCORE: 33

## 2023-04-03 NOTE — ED PROVIDER NOTES
EMERGENCY DEPARTMENT ENCOUNTER       ED Course & Medical Decision Making     3:14 PM I introduced myself to the patient, obtained patient history, performed a physical exam, and discussed plan for ED workup including potential diagnostic laboratory/imaging studies and interventions.    Final Impression  52 year old female presents for evaluation of bilateral atraumatic shoulder pain that began 5 days ago.  States that she also wakes up with numbness in fingers on bilateral hands frequently.  Pain has been getting progressively worse over the last 5 days, feels it is significantly worse in the left shoulder than the right.  No traumas or injuries, no history of chronic shoulder pain or dysfunction.  Denies any known neck trauma or falls.  On exam patient has relatively normal strength and may have some very trace weakness with  on the left, relatively good passive range of motion of bilateral shoulders, but does have pain with extremes of range of motion particularly on the left.  Given the numbness and tingling in bilateral hands, suspect potential cervical cause.  CBC, BMP within normal limits.  CRP borderline at 1.0, uric acid normal at 3.6.  Left shoulder x-ray performed as this is where the majority of her complaint is today, though x-ray itself is fairly reassuring.  CT of the cervical spine shows degenerative changes that produce moderate canal stenosis at see 3-C4 and C4-C5 and right worse than left neuroforaminal narrowing at multiple levels.  Suspect this is correlating to the radicular symptoms she is feeling in both arms and shoulders.  I had planned on ordering MRI, though patient declined/refused MRI.  Was given dose of 80 mg Solu-Medrol as well as Toradol and has had some improvement in her symptoms.  Discussed referral to the spine clinic, would start on prednisone course.    Prior to making a final disposition on this patient the results of patient's tests and other diagnostic studies were  discussed with the patient. All questions were answered. Patient expressed understanding of the plan and was amenable to it.    Medical Decision Making    History:    Supplemental history from: Documented in chart, if applicable    External Record(s) reviewed: Documented in chart, if applicable.    Work Up:    Chart documentation includes differential considered and any EKGs or imaging independently interpreted by provider, where specified.    In additional to work up documented, I considered the following work up: Documented in chart, if applicable.    External consultation:    Discussion of management with another provider: Documented in chart, if applicable    Complicating factors:    Care impacted by chronic illness: Mental Health    Care affected by social determinants of health: N/A    Disposition considerations: Discharge. I prescribed additional prescription strength medication(s) as charted. See documentation for any additional details.      Medications   ketorolac (TORADOL) injection 15 mg (15 mg Intravenous $Given 4/3/23 6410)   methylPREDNISolone sodium succinate (solu-MEDROL) injection 81.25 mg (81.25 mg Intravenous $Given 4/3/23 3208)       Discharge Medication List as of 4/3/2023  6:08 PM      START taking these medications    Details   oxyCODONE (ROXICODONE) 5 MG tablet Take 1 tablet (5 mg) by mouth every 6 hours as needed for pain, Disp-12 tablet, R-0, Local Print      predniSONE (DELTASONE) 20 MG tablet Take 2 tablets (40 mg) by mouth daily for 5 days, Disp-10 tablet, R-0, E-Prescribe           Final Impression     1. Left cervical radiculopathy      Chief Complaint     Chief Complaint   Patient presents with     Shoulder Pain     Patient presents to the ED with bilateral shoulder pain (left is worse than right) x5 days. Patient states she woke up this morning with bilateral hand numbness. Denies any known back troubles, neck troubles, or arthritis. Denies trauma to area. States she has been taking  tylenol around the clock without relief.    HPI     Fallon Sandhu is a 52 year old female with a pertinent medical history of chronic pain disorder who presents for evaluation of shoulder pain.    Patient reports worsening bilateral shoulder pain that began 5 days ago along with bilateral finger numbness. The pain and numbness is worse on the left shoulder compared to the right. The pain is also worse with movement. She's never had shoulder pain before and has been taking 1,500 MG of tylenol daily without any relief. Denies any recent falls or injuries. Denies any neck pain, fever or rash. Patient is right handed.     I, Norman Dumont am serving as a scribe to document services personally performed by Dr. Troy Esquivel MD, based on my observation and the provider's statements to me. I, Dr. Troy Esquivel MD attest that Norman Dumont is acting in a scribe capacity, has observed my performance of the services and has documented them in accordance with my direction.    Past Medical History     Past Medical History:   Diagnosis Date     Asthma      Depression      Migraines      Miscarriage      Pneumonia      Past Surgical History:   Procedure Laterality Date     APPENDECTOMY        SECTION      5x     CHOLECYSTECTOMY       RIGHT OOPHORECTOMY       Family History   Problem Relation Age of Onset     Hypertension Mother      Diabetes Mother      Hypertension Father      Diabetes Father       Social History     Tobacco Use     Smoking status: Every Day     Packs/day: 0.50     Years: 10.00     Pack years: 5.00     Types: Cigarettes     Smokeless tobacco: Never   Substance Use Topics     Alcohol use: No     Drug use: Yes     Types: Marijuana     Allergies   Allergen Reactions     Ibuprofen Itching       Relevant past medical, surgical, family and social history as documented above, has been reviewed and discussed with patient. No changes or additions, unless otherwise noted in the  "HPI.    Current Medications     oxyCODONE (ROXICODONE) 5 MG tablet  predniSONE (DELTASONE) 20 MG tablet  acetaminophen (TYLENOL EXTRA STRENGTH) 500 MG tablet  benzocaine (ANBESOL) 10 % gel  doxylamine (UNISON) 25 mg tablet  ibuprofen (ADVIL,MOTRIN) 600 MG tablet  omeprazole (PRILOSEC) 20 MG capsule  ondansetron (ZOFRAN ODT) 4 MG ODT tab  ondansetron (ZOFRAN) 4 MG tablet  QUEtiapine (SEROQUEL) 200 MG tablet  traMADol (ULTRAM) 50 mg tablet      Physical Exam     BP (!) 112/90 (BP Location: Left arm, Patient Position: Semi-Zapata's, Cuff Size: Adult Regular)   Pulse 91   Temp 98  F (36.7  C) (Temporal)   Resp 19   Ht 1.626 m (5' 4\")   Wt 79.7 kg (175 lb 11.2 oz)   BMI 30.16 kg/m    Constitutional: Awake, alert, in no acute distress.  Head: Normocephalic, atraumatic.  ENT: Mucous membranes moist.  Eyes: Conjunctiva normal.  Respiratory: Respirations even, unlabored, in no acute respiratory distress.  Cardiovascular: Regular rate and rhythm.  Good radial pulses and perfusion in bilateral hands.  GI: Abdomen soft, non-tender.  Musculoskeletal: Moves all 4 extremities equally.  Most of the good passive range of motion of bilateral shoulders, though endorses pain at the extremes of range of motion.  Relatively good  strength bilaterally, though may have some very slight weakness in the left hand  Integument: Warm, dry.  Neurologic: Alert & oriented x 3. Normal speech. Grossly normal motor and sensory function. No focal deficits noted.  Psychiatric: Normal mood    Labs & Imaging     Imaging reviewed and independently interpreted as below;   CT images reviewed, obvious fracture or intracanal pathology  X-ray shoulder images reviewed, no obvious fracture, malalignment or appreciable shoulder arthritis    Results for orders placed or performed during the hospital encounter of 04/03/23   Cervical spine CT w/o contrast    Impression    IMPRESSION:  1.  No fracture or posttraumatic subluxation.  2.  Degenerative changes " produce probably moderate spinal canal stenosis at C3-C4 and C4-C5 and right worse than left neural foraminal narrowing as above. MRI would provide a more accurate delineation of degenerative stenoses in the setting of radicular   symptoms.   XR Shoulder Left G/E 3 Views    Impression    IMPRESSION: Normal alignment. No acute fracture. Minimal degenerative arthrosis of the AC joint. Degenerative changes cervical spine   Basic metabolic panel   Result Value Ref Range    Sodium 140 136 - 145 mmol/L    Potassium 3.9 3.5 - 5.0 mmol/L    Chloride 108 (H) 98 - 107 mmol/L    Carbon Dioxide (CO2) 20 (L) 22 - 31 mmol/L    Anion Gap 12 5 - 18 mmol/L    Urea Nitrogen 5 (L) 8 - 22 mg/dL    Creatinine 0.60 0.60 - 1.10 mg/dL    Calcium 9.4 8.5 - 10.5 mg/dL    Glucose 95 70 - 125 mg/dL    GFR Estimate >90 >60 mL/min/1.73m2   Erythrocyte sedimentation rate auto   Result Value Ref Range    Erythrocyte Sedimentation Rate 15 0 - 20 mm/hr   CRP inflammation   Result Value Ref Range    CRP 1.0 (H) 0.0 - <0.8 mg/dL   Result Value Ref Range    Uric Acid 3.6 2.0 - 7.5 mg/dL   CBC with platelets and differential   Result Value Ref Range    WBC Count 5.8 4.0 - 11.0 10e3/uL    RBC Count 4.72 3.80 - 5.20 10e6/uL    Hemoglobin 15.0 11.7 - 15.7 g/dL    Hematocrit 42.9 35.0 - 47.0 %    MCV 91 78 - 100 fL    MCH 31.8 26.5 - 33.0 pg    MCHC 35.0 31.5 - 36.5 g/dL    RDW 12.9 10.0 - 15.0 %    Platelet Count 283 150 - 450 10e3/uL    % Neutrophils 41 %    % Lymphocytes 49 %    % Monocytes 9 %    % Eosinophils 1 %    % Basophils 0 %    % Immature Granulocytes 0 %    NRBCs per 100 WBC 0 <1 /100    Absolute Neutrophils 2.4 1.6 - 8.3 10e3/uL    Absolute Lymphocytes 2.8 0.8 - 5.3 10e3/uL    Absolute Monocytes 0.5 0.0 - 1.3 10e3/uL    Absolute Eosinophils 0.1 0.0 - 0.7 10e3/uL    Absolute Basophils 0.0 0.0 - 0.2 10e3/uL    Absolute Immature Granulocytes 0.0 <=0.4 10e3/uL    Absolute NRBCs 0.0 10e3/uL        Troy Esquivel MD  04/03/23 9717

## 2023-04-03 NOTE — ED TRIAGE NOTES
Patient presents to the ED with bilateral shoulder pain (left is worse than right) x5 days. Patient states she woke up this morning with bilateral hand numbness. Denies any known back troubles, neck troubles, or arthritis. Denies trauma to area. States she has been taking tylenol around the clock without relief.     Triage Assessment     Row Name 04/03/23 1139       Triage Assessment (Adult)    Airway WDL WDL       Respiratory WDL    Respiratory WDL WDL       Skin Circulation/Temperature WDL    Skin Circulation/Temperature WDL WDL       Cardiac WDL    Cardiac WDL WDL       Peripheral/Neurovascular WDL    Peripheral Neurovascular WDL WDL       Cognitive/Neuro/Behavioral WDL    Cognitive/Neuro/Behavioral WDL WDL

## 2023-05-18 ENCOUNTER — OFFICE VISIT (OUTPATIENT)
Dept: PHYSICAL MEDICINE AND REHAB | Facility: CLINIC | Age: 53
End: 2023-05-18
Attending: STUDENT IN AN ORGANIZED HEALTH CARE EDUCATION/TRAINING PROGRAM
Payer: MEDICARE

## 2023-05-18 VITALS
SYSTOLIC BLOOD PRESSURE: 132 MMHG | BODY MASS INDEX: 28.68 KG/M2 | HEIGHT: 64 IN | HEART RATE: 95 BPM | WEIGHT: 168 LBS | DIASTOLIC BLOOD PRESSURE: 75 MMHG

## 2023-05-18 DIAGNOSIS — F40.240 CLAUSTROPHOBIA: ICD-10-CM

## 2023-05-18 DIAGNOSIS — R20.2 PARESTHESIA: ICD-10-CM

## 2023-05-18 DIAGNOSIS — M54.50 LUMBAR SPINE PAIN: ICD-10-CM

## 2023-05-18 DIAGNOSIS — M48.02 CERVICAL STENOSIS OF SPINAL CANAL: Primary | ICD-10-CM

## 2023-05-18 DIAGNOSIS — R53.1 WEAKNESS: ICD-10-CM

## 2023-05-18 PROCEDURE — 99204 OFFICE O/P NEW MOD 45 MIN: CPT | Performed by: PHYSICIAN ASSISTANT

## 2023-05-18 RX ORDER — GABAPENTIN 300 MG/1
CAPSULE ORAL
Qty: 90 CAPSULE | Refills: 0 | Status: SHIPPED | OUTPATIENT
Start: 2023-05-18 | End: 2024-06-13

## 2023-05-18 RX ORDER — DIAZEPAM 5 MG
TABLET ORAL
Qty: 2 TABLET | Refills: 0 | Status: SHIPPED | OUTPATIENT
Start: 2023-05-18

## 2023-05-18 ASSESSMENT — PAIN SCALES - GENERAL: PAINLEVEL: WORST PAIN (10)
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
## 2023-05-18 NOTE — PATIENT INSTRUCTIONS
An order for physicaltherapy has been provided today.  Someone will call you to schedule physical therapy or you can call [phone] to schedule physical therapy.  It will be very important for you to do your physical therapy exercises on aregular basis to decrease your pain and prevent future flares of pain.      [facility] Scheduling    Please call [phone] to schedule your image(s) (select option#1). There are 2 different locations, see below. You can do walk-in visits for xray only images if you want.     [place] [id number] [place] [id number]     Prescribed Gabapentin today, 300 mg tablets, to be titrated up to 1 tablets 3 times a day as tolerated for your nerve pain. Please follow Gabapentin dosing chart below.    Gabapentin 300mg Dosing Chart    DATE  MORNING AFTERNOON BEDTIME    Day 1 0 0 1    Day 2 0 0 1    Day 3 0 0 1    Day 4 1 0 1    Day 5 1 0 1    Day 6 1 0 1    Day 7 1 1 1    Day 8 1 1 1    Day 9 1 1 1   Continue medication, taking 1 capsules three times daily  Please call if you have any questions regarding how to take your medication

## 2023-05-19 ENCOUNTER — TELEPHONE (OUTPATIENT)
Dept: PHYSICAL MEDICINE AND REHAB | Facility: CLINIC | Age: 53
End: 2023-05-19
Payer: MEDICARE

## 2023-05-19 NOTE — TELEPHONE ENCOUNTER
[facility] Call Center    Phone Message    May a detailed message be left on voicemail: yes     Reason for Call: Other: Pt is asking why she would only get 2 tablets for diazepam (VALIUM) 5 MG tablet     Action Taken: Other: sarah spine    Travel Screening: Not Applicable

## 2023-05-19 NOTE — TELEPHONE ENCOUNTER
Call placed to pt. Pt reports she only received 2 tablets of Valium. Explained this is correct as this is for her MRI appt. Offered to warm transfer patient to radiology to schedule. She reports she will call to schedule when she is able too. Phone # given. Encouraged her to call as soon as she is able to.

## 2023-06-27 NOTE — PROGRESS NOTES
Assessment:   Fallon Sandhu is a 52 year old y.o. female with past medical history significant for chronic pain disorder, congenital absence of kidney, undifferentiated schizophrenia, depression, bipolar 1 disorder, PTSD, cannabis use disorder, insomnia, tobacco use disorder  who presents today for follow-up regarding 2 areas of pain.  1.    Chronic neck pain with radiation into the bilateral shoulders and bilateral hand paresthesias.  Patient also complains of left greater than right upper extremity weakness with .  My review of a CT cervical spine shows multilevel cervical spondylosis with probably moderate spinal canal stenosis C3-4 and C4-5 with multilevel right greater than left foraminal stenosis.  I am concerned that her spinal stenosis may actually be more severe than is appreciated on the CT.  Patient demonstrated weakness today in the bilateral finger flexors and abductors.  2.  Chronic bilateral low back pain without radicular symptoms. I reviewed a CT abdomen pelvis from July 2022.  This shows lower lumbar facet arthropathy.  There is suggestion of spinal stenosis L4-5.         Plan:     A shared decision making plan was used.  The patient's values and choices were respected.  The following represents what was discussed and decided upon by the physician assistant and the patient.      1.  DIAGNOSTIC TESTS:    - I reviewed the CT cervical spine.  - I reviewed the x-ray left shoulder  - I had previously ordered MRI scans of the cervical and lumbar spine for further evaluation May 18, 2023.  Patient reports that she did not go to the MRI appointment because she was scared of feeling claustrophobic.  She does want to schedule now.  I will prescribe Valium.  - I also ordered an EMG bilateral upper extremities.  Suspect she may have carpal tunnel syndrome.  She had a positive Tinel sign bilateral carpal tunnel and a positive Phalen sign left.    2.  PHYSICAL THERAPY: I had referred the patient to  physical therapy May 18, 2023.  Patient reports that she did not go to physical therapy because she was ill.  I gave her the phone number to schedule now.    3.  MEDICATIONS:  - Valium 5 mg, #1 with no refills was prescribed.  She will use this before her MRI.  She still has 1 tab of Valium remaining from her previous prescription for Valium.  She will take 1 tab 2 hours before the MRI and 1 tab 1 hour before the MRI if needed.  -Patient had been taking gabapentin 300 mg 3 times daily which was somewhat helpful.  She request that we try the 400 mg dose.  I provided a new prescription for gabapentin 400 mg 3 times daily.    4.  INTERVENTIONS: No interventions were ordered.  Patient to potentially benefit from interventional pain management if she fails to improve with conservative treatment, depending on the results of her MRI scans.    5.  PATIENT EDUCATION:   Patient is in agreement the above plan.  All questions were answered.    6.  FOLLOW-UP: Patient is going to follow-up with me in 1 month to monitor progress with physical therapy, gabapentin, and review her MRI results.  If she has questions or concerns in the meantime, she should not hesitate to call.    Subjective:     Fallon Sandhu is a 52 year old female who presents today for follow-up regarding chronic neck and back pain.  I saw the patient in consultation May 18, 2023.  At that time I ordered MRI scans of the cervical and lumbar spine.  Patient reports that she did not go to get her MRI scans because she was afraid of feeling claustrophobic.    I also refer the patient to physical therapy. Patient reports that she did not go to physical therapy because she was sick. I prescribed gabapentin 300 mg 3 times daily which was somewhat helpful but she ran out.    Patient complains of bilateral neck pain.  Pain extends into both shoulders.  She has intermittent pain that radiates all the way down both arms to the hands and she has numbness and tingling in  both hands.  She feels weak in both hands with .    Patient also complains of bilateral low back pain.  Pain spans across low back in a broadband distribution at the belt line.  Denies pain down the legs.  Denies numbness, tingling, weakness down the legs.    Overall, patient rates her pain today as a 10 out of 10.  She denies aggravating or alleviating factors to the pain.    Treatment to date:  - No physical therapy  - No chiropractic treatment  - No spine injections  - No spine surgeries  - Advil is not helpful  - Tylenol is not helpful  - Oxycodone was helpful  - Prednisone was helpful  - Gabapentin 300 mg 3 times daily somewhat helpful, denies side effects    Review of Systems:  Positive for numbness/tingling, weakness, headache, pain much worse in neck, difficulty with hand skills.  Negative for loss of bowel/bladder control, inability to urinate, trip/stumble/falls, difficulty swallowing, fevers, unintentional weight loss.     Objective:   CONSTITUTIONAL:  Vital signs as above.  No acute distress.  The patient is well nourished and well groomed.    PSYCHIATRIC:  The patient is awake, alert, oriented to person, place and time.  The patient is answering questions appropriately with clear speech.  Normal affect.  HEENT: Normocephalic, atraumatic.  Sclera clear.    SKIN:  Skin over the face, neck bilateral upper extremities is clean, dry, intact without rashes.  MUSCULOSKELETAL: The patient has 4/5 strength bilateral finger flexors and abductors.  5/5 strength for the bilateral shoulder abductors, elbow flexors/extensors, wrist extensors.  Tender to palpation bilateral cervical paraspinous muscles and upper trapezius muscles.  Tender to palpation bilateral lower lumbar paraspinous muscles.  NEUROLOGICAL: Positive Tinel sign bilateral carpal tunnel.  Positive Phalen sign left. Negative Dozier's bilaterally.  Sensation to light touch is diminished fingertips of both hands.    RESULTS:    I reviewed a CT  cervical spine from [facility] dated April 3, 2023.  This shows reversal of normal cervical lordosis.  There is multilevel disc degeneration with disc osteophyte complexes contributing to probably moderate spinal canal stenosis C3-4 and C4-5.  There is moderate to severe right-sided foraminal stenosis C3-4 and C4-5 and moderate foraminal stenosis C6-7.  There is less pronounced left-sided foraminal stenosis moderate C4-5 and mild elsewhere.     Reviewed the x-ray left shoulder which shows minimal degenerative arthrosis of the acromioclavicular joint.     I reviewed the CT abdomen pelvis from [facility] dated July 12, 2022.  This shows lower lumbar facet arthropathy.  There is suggestion of lumbar spinal stenosis L4-5.

## 2023-06-30 ENCOUNTER — OFFICE VISIT (OUTPATIENT)
Dept: PHYSICAL MEDICINE AND REHAB | Facility: CLINIC | Age: 53
End: 2023-06-30
Payer: MEDICARE

## 2023-06-30 VITALS — HEIGHT: 64 IN | BODY MASS INDEX: 28.1 KG/M2 | WEIGHT: 164.6 LBS

## 2023-06-30 DIAGNOSIS — R20.2 PARESTHESIA: ICD-10-CM

## 2023-06-30 DIAGNOSIS — F40.240 CLAUSTROPHOBIA: ICD-10-CM

## 2023-06-30 DIAGNOSIS — M48.02 CERVICAL STENOSIS OF SPINAL CANAL: Primary | ICD-10-CM

## 2023-06-30 DIAGNOSIS — M54.50 LUMBAR SPINE PAIN: ICD-10-CM

## 2023-06-30 PROCEDURE — 99214 OFFICE O/P EST MOD 30 MIN: CPT | Performed by: PHYSICIAN ASSISTANT

## 2023-06-30 RX ORDER — GABAPENTIN 400 MG/1
400 CAPSULE ORAL 3 TIMES DAILY
Qty: 90 CAPSULE | Refills: 1 | Status: SHIPPED | OUTPATIENT
Start: 2023-06-30 | End: 2023-08-01

## 2023-06-30 RX ORDER — DIAZEPAM 5 MG
TABLET ORAL
Qty: 1 TABLET | Refills: 0 | Status: SHIPPED | OUTPATIENT
Start: 2023-06-30 | End: 2024-06-13

## 2023-06-30 ASSESSMENT — PAIN SCALES - GENERAL: PAINLEVEL: WORST PAIN (10)

## 2023-06-30 NOTE — PATIENT INSTRUCTIONS
Please call [phone] to schedule your MRI (select option#1).     Physical therapy: [phone]

## 2023-06-30 NOTE — LETTER
6/30/2023         RE: Fallon Sandhu  [address]  [place] [address]        Dear Colleague,    Thank you for referring your patient, Fallon Sandhu, to the [facility]. Please see a copy of my visit note below.    Assessment:   Fallon Sandhu is a 52 year old y.o. female with past medical history significant for chronic pain disorder, congenital absence of kidney, undifferentiated schizophrenia, depression, bipolar 1 disorder, PTSD, cannabis use disorder, insomnia, tobacco use disorder  who presents today for follow-up regarding 2 areas of pain.  1.    Chronic neck pain with radiation into the bilateral shoulders and bilateral hand paresthesias.  Patient also complains of left greater than right upper extremity weakness with .  My review of a CT cervical spine shows multilevel cervical spondylosis with probably moderate spinal canal stenosis C3-4 and C4-5 with multilevel right greater than left foraminal stenosis.  I am concerned that her spinal stenosis may actually be more severe than is appreciated on the CT.  Patient demonstrated weakness today in the bilateral finger flexors and abductors.  2.  Chronic bilateral low back pain without radicular symptoms. I reviewed a CT abdomen pelvis from July 2022.  This shows lower lumbar facet arthropathy.  There is suggestion of spinal stenosis L4-5.         Plan:     A shared decision making plan was used.  The patient's values and choices were respected.  The following represents what was discussed and decided upon by the physician assistant and the patient.      1.  DIAGNOSTIC TESTS:    - I reviewed the CT cervical spine.  - I reviewed the x-ray left shoulder  - I had previously ordered MRI scans of the cervical and lumbar spine for further evaluation May 18, 2023.  Patient reports that she did not go to the MRI appointment because she was scared of feeling claustrophobic.  She does want to schedule now.  I will  prescribe Valium.  - I also ordered an EMG bilateral upper extremities.  Suspect she may have carpal tunnel syndrome.  She had a positive Tinel sign bilateral carpal tunnel and a positive Phalen sign left.    2.  PHYSICAL THERAPY: I had referred the patient to physical therapy May 18, 2023.  Patient reports that she did not go to physical therapy because she was ill.  I gave her the phone number to schedule now.    3.  MEDICATIONS:  - Valium 5 mg, #1 with no refills was prescribed.  She will use this before her MRI.  She still has 1 tab of Valium remaining from her previous prescription for Valium.  She will take 1 tab 2 hours before the MRI and 1 tab 1 hour before the MRI if needed.  -Patient had been taking gabapentin 300 mg 3 times daily which was somewhat helpful.  She request that we try the 400 mg dose.  I provided a new prescription for gabapentin 400 mg 3 times daily.    4.  INTERVENTIONS: No interventions were ordered.  Patient to potentially benefit from interventional pain management if she fails to improve with conservative treatment, depending on the results of her MRI scans.    5.  PATIENT EDUCATION:   Patient is in agreement the above plan.  All questions were answered.    6.  FOLLOW-UP: Patient is going to follow-up with me in 1 month to monitor progress with physical therapy, gabapentin, and review her MRI results.  If she has questions or concerns in the meantime, she should not hesitate to call.    Subjective:     Fallon Sandhu is a 52 year old female who presents today for follow-up regarding chronic neck and back pain.  I saw the patient in consultation May 18, 2023.  At that time I ordered MRI scans of the cervical and lumbar spine.  Patient reports that she did not go to get her MRI scans because she was afraid of feeling claustrophobic.    I also refer the patient to physical therapy. Patient reports that she did not go to physical therapy because she was sick. I prescribed gabapentin 300  mg 3 times daily which was somewhat helpful but she ran out.    Patient complains of bilateral neck pain.  Pain extends into both shoulders.  She has intermittent pain that radiates all the way down both arms to the hands and she has numbness and tingling in both hands.  She feels weak in both hands with .    Patient also complains of bilateral low back pain.  Pain spans across low back in a broadband distribution at the belt line.  Denies pain down the legs.  Denies numbness, tingling, weakness down the legs.    Overall, patient rates her pain today as a 10 out of 10.  She denies aggravating or alleviating factors to the pain.    Treatment to date:  - No physical therapy  - No chiropractic treatment  - No spine injections  - No spine surgeries  - Advil is not helpful  - Tylenol is not helpful  - Oxycodone was helpful  - Prednisone was helpful  - Gabapentin 300 mg 3 times daily somewhat helpful, denies side effects    Review of Systems:  Positive for numbness/tingling, weakness, headache, pain much worse in neck, difficulty with hand skills.  Negative for loss of bowel/bladder control, inability to urinate, trip/stumble/falls, difficulty swallowing, fevers, unintentional weight loss.     Objective:   CONSTITUTIONAL:  Vital signs as above.  No acute distress.  The patient is well nourished and well groomed.    PSYCHIATRIC:  The patient is awake, alert, oriented to person, place and time.  The patient is answering questions appropriately with clear speech.  Normal affect.  HEENT: Normocephalic, atraumatic.  Sclera clear.    SKIN:  Skin over the face, neck bilateral upper extremities is clean, dry, intact without rashes.  MUSCULOSKELETAL: The patient has 4/5 strength bilateral finger flexors and abductors.  5/5 strength for the bilateral shoulder abductors, elbow flexors/extensors, wrist extensors.  Tender to palpation bilateral cervical paraspinous muscles and upper trapezius muscles.  Tender to palpation  bilateral lower lumbar paraspinous muscles.  NEUROLOGICAL: Positive Tinel sign bilateral carpal tunnel.  Positive Phalen sign left. Negative Dozier's bilaterally.  Sensation to light touch is diminished fingertips of both hands.    RESULTS:    I reviewed a CT cervical spine from [facility] dated April 3, 2023.  This shows reversal of normal cervical lordosis.  There is multilevel disc degeneration with disc osteophyte complexes contributing to probably moderate spinal canal stenosis C3-4 and C4-5.  There is moderate to severe right-sided foraminal stenosis C3-4 and C4-5 and moderate foraminal stenosis C6-7.  There is less pronounced left-sided foraminal stenosis moderate C4-5 and mild elsewhere.     Reviewed the x-ray left shoulder which shows minimal degenerative arthrosis of the acromioclavicular joint.     I reviewed the CT abdomen pelvis from [facility] dated July 12, 2022.  This shows lower lumbar facet arthropathy.  There is suggestion of lumbar spinal stenosis L4-5.           Again, thank you for allowing me to participate in the care of your patient.        Sincerely,        Frances Jeff PA-C

## 2023-07-06 ENCOUNTER — THERAPY VISIT (OUTPATIENT)
Dept: PHYSICAL THERAPY | Facility: REHABILITATION | Age: 53
End: 2023-07-06
Payer: MEDICARE

## 2023-07-06 DIAGNOSIS — M54.50 LUMBAR SPINE PAIN: ICD-10-CM

## 2023-07-06 DIAGNOSIS — R53.1 WEAKNESS: ICD-10-CM

## 2023-07-06 DIAGNOSIS — R20.2 PARESTHESIA: ICD-10-CM

## 2023-07-06 DIAGNOSIS — M48.02 CERVICAL STENOSIS OF SPINAL CANAL: ICD-10-CM

## 2023-07-06 PROCEDURE — 97162 PT EVAL MOD COMPLEX 30 MIN: CPT | Mod: GP | Performed by: PHYSICAL THERAPIST

## 2023-07-06 PROCEDURE — 97110 THERAPEUTIC EXERCISES: CPT | Mod: GP | Performed by: PHYSICAL THERAPIST

## 2023-07-06 NOTE — PROGRESS NOTES
PHYSICAL THERAPY EVALUATION  Type of Visit: Evaluation    See electronic medical record for Abuse and Falls Screening details.    Subjective      Presenting condition or subjective complaint: Neck and back pain, arm and leg numbness. She has been having severe neck pain for the last four months with radiation to both shoulders and paraesthesias in both hands. Her low back pain has been worsening for the last two months.  Date of onset: 05/18/23    Relevant medical history: Migraines or headaches; Neck injury; Severe headaches; Significant weakness; Unexplained weight loss   Dates & types of surgery: 3 c-sections, gall bladder, bowel reconstruction 2x's    Prior diagnostic imaging/testing results: CT scan; X-ray     Prior therapy history for the same diagnosis, illness or injury: No      Prior Level of Function   Transfers: Independent  Ambulation: Independent  ADL: Independent  IADL: Housekeeping, Laundry, Meal preparation    Living Environment  Social support: With family members   Type of home: Apartment/condo   Stairs to enter the home: No       Ramp:     Stairs inside the home:         Help at home: Self Cares (home health aide/personal care attendant, family, etc)  Equipment owned: Straight Cane (SHe would like a commode, raised toilet seat, and a lift chair)     Employment: No    Hobbies/Interests:      Patient goals for therapy: Exercise and sleep properly    Pain assessment: Pain present     Objective   LUMBAR SPINE EVALUATION  PAIN: Pain Level at Rest: 10/10  Pain Level with Use: 10/10  INTEGUMENTARY (edema, incisions):   POSTURE: Sitting Posture: Rounded shoulders, Forward head, Lordosis increased, Thoracic kyphosis increased  GAIT:   Weightbearing Status: WBAT  Assistive Device(s): None  Gait Deviations: Stride length decreased  Callie decreased  Reverse Trendelenburg bilaterally  BALANCE/PROPRIOCEPTION:   WEIGHTBEARING ALIGNMENT:   NON-WEIGHTBEARING ALIGNMENT:    ROM:   (Degrees) Left AROM Left PROM   Right AROM Right PROM   Hip Flexion 90  100    Hip Extension       Hip Abduction       Hip Adduction       Hip Internal Rotation WNL  WNL    Hip External Rotation WNL  WNL    Knee Flexion       Knee Extension       Lumbar Side glide     Lumbar Flexion To knees   Lumbar Extension WNL, pain   Pain:   End feel:     MYOTOMES:    Left Right   T12-L3 (Hip Flexion) 3+ 3+   L2-4 (Quads)  4- 4-   L4 (Ankle DF) 3+ 3+   L5 (Great Toe Ext) 3+ 3+   S1 (Toe Raise) 3 3      Left Right   C1-2 (Neck Flexion) 3 3   C3 (Neck Side Bend)  3 3   C4 (Shrug) 3+ 3+   C5 (Deltoid) 3+ 3+   C6 (Biceps) 3+ 3+   C7 (Triceps) 3+ 3+   C8 (Thumb Ext) 3+ 3+   T1 (Intrinsics) 3+ 3+     DTR S:    Left Right   C5 (Biceps) 2 2   C6 (Brachioradialis) 2 2   C7 (Triceps) 2 2   L4 (Quad) 3 2   S1 (Achilles) 2 2     CORD SIGNS:   DERMATOMES:   NEURAL TENSION:   FLEXIBILITY:   LUMBAR/HIP Special Tests:    Left Right   RACHELL Positive Positive   FADIR/Labrum/EDDIE Positive Positive   Femoral Nerve     Hetal's     Piriformis Positive Positive   Quadrant Testing     SLR Positive Positive   Slump Positive Positive   Stork with Extension     Anmol             PELVIS/SI SPECIAL TESTS:   FUNCTIONAL TESTS:   PALPATION:   + Tenderness At Location Left Right   Quadratus Lumborum + +   Erector Spinae + +   Piriformis  + +   PSIS + +   ASIS - -   Iliac Crest - -   Glut Medius + +   Greater Trochanter     Ischial Tuberosity     Hamstrings + +   Hip Flexors + +   Vertebral  + +       CERVICAL SPINE EVALUATION  PAIN: Pain Level at Rest: 10/10  Pain Level with Use: 10/10  INTEGUMENTARY (edema, incisions):   POSTURE: Sitting Posture: Rounded shoulders, Forward head, Lordosis increased, Thoracic kyphosis increased  GAIT:   Weightbearing Status: WBAT  Assistive Device(s): None  Gait Deviations: Stride length decreased  Callie decreased  Reverse Trendelenburg bilaterally  BALANCE/PROPRIOCEPTION:   WEIGHTBEARING ALIGNMENT:   ROM:   (Degrees) Left AROM Right AROM    Cervical Flexion  40    Cervical Extension 25    Cervical Side bend 30 30    Cervical Rotation 30 65    Cervical Protrusion     Cervical Retraction     Thoracic Flexion     Thoracic Extension     Thoracic Rotation       Left AROM Left PROM Right AROM Right PROM   Shoulder Flexion       Shoulder Extension       Shoulder Abduction       Shoulder Adduction       Shoulder IR       Shoulder ER       Shoulder Horiz Abduction       Shoulder Horiz Adduction       Pain:   End Feel:     MYOTOMES: See lumbar section  DTR S:  See lumbar section  CORD SIGNS:   DERMATOMES:   NEURAL TENSION:   FLEXIBILITY:    SPECIAL TESTS:    Left Right   Alar Ligament Negative    Cervical Flexion-Rotation Negative  Negative    Cervical Rot/Lateral Flex     Compression Positive Positive   Distraction Negative  Negative    Spurling s Positive Positive   Thoracic Outlet Screen (Samson, Adson)     Transverse Ligament Negative  Negative    Vertebral Artery     Cotton Roll Test     Craniocervical Flexor Endurance Test     Mannheimer Test            PALPATION:   + Tenderness At Location Left Right   Sternocleidomastoid + +   Scalenes - +   Rhomboids     Facet + +   Upper Trap + +   Levator + +   Erector Spinae + +   Suboccipitals + +       Assessment & Plan   CLINICAL IMPRESSIONS   Medical Diagnosis: Cervical stenosis of spinal canal, Lumbar spine pain, Weakness, Paresthesia    Treatment Diagnosis: Decreased activity tolerance, impaired strength, altered gait pattern, lumbar pain, cervical pain.   Impression/Assessment: Patient is a 52 year old female with cervical and lumbar pain and UE paresthesia complaints.  The following significant findings have been identified: Pain, Decreased ROM/flexibility, Decreased joint mobility, Decreased strength, Impaired sensation, Impaired gait, Impaired muscle performance, Decreased activity tolerance and Impaired posture. These impairments interfere with their ability to perform self care tasks, recreational activities, household  chores, driving , household mobility and community mobility as compared to previous level of function.     Clinical Decision Making (Complexity):   Clinical Presentation: Unstable/Unpredictable   Clinical Presentation Rationale: based on medical and personal factors listed in PT evaluation  Clinical Decision Making (Complexity): Moderate complexity    PLAN OF CARE  Treatment Interventions:  Modalities: Cryotherapy, Dry Needling, E-stim, Hot Pack, Mechanical Traction  Interventions: Gait Training, Manual Therapy, Neuromuscular Re-education, Therapeutic Activity, Therapeutic Exercise, Self-Care/Home Management    Long Term Goals     PT Goal 1  Goal Identifier: Strength  Goal Description: Fallon will demonstrate gross strength of >/=4/5 without pain in order to improve the ease of their ADLs.  Rationale: to maximize safety and independence with performance of ADLs and functional tasks  Goal Progress: In progress  Target Date: 10/04/23  PT Goal 2  Goal Identifier: NDI  Goal Description: Fallon will demonstrate a decrease in pain and increase in function as measured by scoring </= 24/50 on the NDI.  Rationale: to maximize safety and independence with performance of ADLs and functional tasks  Goal Progress: 34/50  Target Date: 10/04/23  PT Goal 3  Goal Identifier: JOSE ALFREDO  Goal Description: Fallon will demonstrate a decrease in pain and increase in function as measured by scoring </= 50% on the M VÍCTOR.  Rationale: to maximize safety and independence with performance of ADLs and functional tasks  Goal Progress: 66%  Target Date: 10/04/23  PT Goal 4  Goal Identifier: HEP  Goal Description: Fallon will be independent in their HEP in order to facilitate return to prior level of function.  Rationale: to maximize safety and independence with performance of ADLs and functional tasks  Goal Progress: In progress  Target Date: 10/04/23      Frequency of Treatment: 1 x/week  Duration of Treatment: 90 days    Recommended Referrals to  Other Professionals:   Education Assessment:   Learner/Method: Patient;Family  Education Comments: Education on extension preference and how to progress the exercises.    Risks and benefits of evaluation/treatment have been explained.   Patient/Family/caregiver agrees with Plan of Care.     Evaluation Time:     PT Eval, Moderate Complexity Minutes (10298): 25      Signing Clinician: FLORENCE JOHNSON [facility]                                                                                   OUTPATIENT PHYSICAL THERAPY      PLAN OF TREATMENT FOR OUTPATIENT REHABILITATION   Patient's Last Name, First Name, Fallon Damico YOB: 1970   Provider's Name   [facility]   Medical Record No.  [id number]     Onset Date: 05/18/23  Start of Care Date: 07/06/23     Medical Diagnosis:  Cervical stenosis of spinal canal, Lumbar spine pain, Weakness, Paresthesia      PT Treatment Diagnosis:  Decreased activity tolerance, impaired strength, altered gait pattern, lumbar pain, cervical pain. Plan of Treatment  Frequency/Duration: 1 x/week/ 90 days    Certification date from 07/06/23 to 10/04/23         See note for plan of treatment details and functional goals     YISSEL KAPLAN, PT                         I CERTIFY THE NEED FOR THESE SERVICES FURNISHED UNDER        THIS PLAN OF TREATMENT AND WHILE UNDER MY CARE     (Physician attestation of this document indicates review and certification of the therapy plan).                  Referring Provider:  Frances Jeff PA-C      Initial Assessment  See Epic Evaluation- Start of Care Date: 07/06/23

## 2023-07-24 NOTE — PROGRESS NOTES
DISCHARGE  Reason for Discharge: Patient has failed to schedule further appointments.    Discharge Plan: Patient to continue home program.    Referring Provider:  Frances Jeff

## 2023-12-18 ENCOUNTER — HOSPITAL ENCOUNTER (EMERGENCY)
Facility: CLINIC | Age: 53
Discharge: HOME | End: 2023-12-18
Admitting: PHYSICIAN ASSISTANT
Payer: MEDICARE

## 2023-12-18 VITALS
DIASTOLIC BLOOD PRESSURE: 84 MMHG | HEART RATE: 67 BPM | TEMPERATURE: 96.9 F | BODY MASS INDEX: 26.61 KG/M2 | OXYGEN SATURATION: 98 % | WEIGHT: 155 LBS | RESPIRATION RATE: 19 BRPM | SYSTOLIC BLOOD PRESSURE: 136 MMHG

## 2023-12-18 DIAGNOSIS — R10.84 GENERALIZED ABDOMINAL PAIN: ICD-10-CM

## 2023-12-18 DIAGNOSIS — R10.12 LUQ ABDOMINAL PAIN: ICD-10-CM

## 2023-12-18 DIAGNOSIS — S05.01XA ABRASION OF RIGHT CORNEA, INITIAL ENCOUNTER: ICD-10-CM

## 2023-12-18 LAB
ANION GAP SERPL CALCULATED.3IONS-SCNC: 11 MMOL/L (ref 7–15)
BASOPHILS # BLD AUTO: 0 10E3/UL (ref 0–0.2)
BASOPHILS NFR BLD AUTO: 0 %
BUN SERPL-MCNC: 9.7 MG/DL (ref 6–20)
CALCIUM SERPL-MCNC: 10.2 MG/DL (ref 8.6–10)
CHLORIDE SERPL-SCNC: 104 MMOL/L (ref 98–107)
CREAT SERPL-MCNC: 0.56 MG/DL (ref 0.51–0.95)
DEPRECATED HCO3 PLAS-SCNC: 23 MMOL/L (ref 22–29)
EGFRCR SERPLBLD CKD-EPI 2021: >90 ML/MIN/1.73M2
EOSINOPHIL # BLD AUTO: 0.1 10E3/UL (ref 0–0.7)
EOSINOPHIL NFR BLD AUTO: 1 %
ERYTHROCYTE [DISTWIDTH] IN BLOOD BY AUTOMATED COUNT: 12.5 % (ref 10–15)
GLUCOSE SERPL-MCNC: 115 MG/DL (ref 70–99)
HCT VFR BLD AUTO: 44.2 % (ref 35–47)
HGB BLD-MCNC: 15.1 G/DL (ref 11.7–15.7)
IMM GRANULOCYTES # BLD: 0 10E3/UL
IMM GRANULOCYTES NFR BLD: 0 %
LIPASE SERPL-CCNC: 18 U/L (ref 13–60)
LYMPHOCYTES # BLD AUTO: 3 10E3/UL (ref 0.8–5.3)
LYMPHOCYTES NFR BLD AUTO: 49 %
MCH RBC QN AUTO: 30.6 PG (ref 26.5–33)
MCHC RBC AUTO-ENTMCNC: 34.2 G/DL (ref 31.5–36.5)
MCV RBC AUTO: 90 FL (ref 78–100)
MONOCYTES # BLD AUTO: 0.5 10E3/UL (ref 0–1.3)
MONOCYTES NFR BLD AUTO: 8 %
NEUTROPHILS # BLD AUTO: 2.5 10E3/UL (ref 1.6–8.3)
NEUTROPHILS NFR BLD AUTO: 42 %
NRBC # BLD AUTO: 0 10E3/UL
NRBC BLD AUTO-RTO: 0 /100
PLATELET # BLD AUTO: 310 10E3/UL (ref 150–450)
POTASSIUM SERPL-SCNC: 3.8 MMOL/L (ref 3.4–5.3)
RBC # BLD AUTO: 4.94 10E6/UL (ref 3.8–5.2)
SODIUM SERPL-SCNC: 138 MMOL/L (ref 135–145)
TROPONIN T SERPL HS-MCNC: <6 NG/L
WBC # BLD AUTO: 6.1 10E3/UL (ref 4–11)

## 2023-12-18 PROCEDURE — 93005 ELECTROCARDIOGRAM TRACING: CPT | Performed by: EMERGENCY MEDICINE

## 2023-12-18 PROCEDURE — 36415 COLL VENOUS BLD VENIPUNCTURE: CPT | Performed by: EMERGENCY MEDICINE

## 2023-12-18 PROCEDURE — 250N000009 HC RX 250: Performed by: PHYSICIAN ASSISTANT

## 2023-12-18 PROCEDURE — 83690 ASSAY OF LIPASE: CPT | Performed by: EMERGENCY MEDICINE

## 2023-12-18 PROCEDURE — 85025 COMPLETE CBC W/AUTO DIFF WBC: CPT | Performed by: EMERGENCY MEDICINE

## 2023-12-18 PROCEDURE — 250N000013 HC RX MED GY IP 250 OP 250 PS 637: Performed by: PHYSICIAN ASSISTANT

## 2023-12-18 PROCEDURE — 84484 ASSAY OF TROPONIN QUANT: CPT | Performed by: EMERGENCY MEDICINE

## 2023-12-18 PROCEDURE — 99284 EMERGENCY DEPT VISIT MOD MDM: CPT

## 2023-12-18 PROCEDURE — 80048 BASIC METABOLIC PNL TOTAL CA: CPT | Performed by: EMERGENCY MEDICINE

## 2023-12-18 RX ORDER — MAGNESIUM HYDROXIDE/ALUMINUM HYDROXICE/SIMETHICONE 120; 1200; 1200 MG/30ML; MG/30ML; MG/30ML
15 SUSPENSION ORAL ONCE
Status: COMPLETED | OUTPATIENT
Start: 2023-12-18 | End: 2023-12-18

## 2023-12-18 RX ORDER — TETRACAINE HYDROCHLORIDE 5 MG/ML
1 SOLUTION OPHTHALMIC ONCE
Status: COMPLETED | OUTPATIENT
Start: 2023-12-18 | End: 2023-12-18

## 2023-12-18 RX ORDER — ERYTHROMYCIN 5 MG/G
0.5 OINTMENT OPHTHALMIC AT BEDTIME
Qty: 3.5 G | Refills: 0 | Status: SHIPPED | OUTPATIENT
Start: 2023-12-18 | End: 2024-06-13

## 2023-12-18 RX ADMIN — TETRACAINE HYDROCHLORIDE 1 DROP: 5 SOLUTION OPHTHALMIC at 20:16

## 2023-12-18 RX ADMIN — FLUORESCEIN SODIUM 1 STRIP: 1 STRIP OPHTHALMIC at 20:16

## 2023-12-18 RX ADMIN — ALUMINUM HYDROXIDE, MAGNESIUM HYDROXIDE, AND SIMETHICONE 15 ML: 200; 200; 20 SUSPENSION ORAL at 20:16

## 2023-12-19 NOTE — ED TRIAGE NOTES
Pt arrives with LUQ pain for the past 3 days. Reports it feels heavy and like she has gas. Little diarrhea this AM. No vomiting.      Triage Assessment (Adult)       Row Name 12/18/23 2793          Triage Assessment    Airway WDL WDL        Respiratory WDL    Respiratory WDL WDL        Skin Circulation/Temperature WDL    Skin Circulation/Temperature WDL WDL        Cardiac WDL    Cardiac WDL WDL        Peripheral/Neurovascular WDL    Peripheral Neurovascular WDL WDL        Cognitive/Neuro/Behavioral WDL    Cognitive/Neuro/Behavioral WDL WDL

## 2023-12-19 NOTE — DISCHARGE INSTRUCTIONS
You were seen here in the emergency department for evaluation of left upper quadrant abdominal pain.  There is no abnormalities on your laboratory testing here, your pancreas, liver, and heart testing is all normal.  I want you to continue to take a medication for acid reflux, start something like Prilosec, omeprazole if you are not currently taking it.  You can buy this over-the-counter.  For your eye, if you continue to have symptoms, want you to follow-up with an eye doctor, have included the number for [facility] if you choose to follow-up with them.  I have sent a prescription for some topical antibiotics, which can help with some of the irritation there.  Please follow-up with your primary care doctor.    For pain or fever you may use:  -Tylenol 650 mg every 6 hours.  Max 4000 mg in 24 hours  Do not use thismedication with alcohol as it can cause liver problems.  -Ibuprofen 600 mg every 6 hours.  Max 3500 mg in 24 hours  Do not take this medication if you have a history of a GI bleed or have kidney problems.  You may use both of these medications at the same time or you can alternate them every 3 hours.  For example, Tylenol at 6 AM, ibuprofen at 9 AM, Tylenol at noon, etc.

## 2023-12-19 NOTE — ED PROVIDER NOTES
EMERGENCY DEPARTMENT ENCOUNTER      NAME: Fallon Sandhu  AGE: 53 year old female  YOB: 1970  MRN: [id number]  EVALUATION DATE & TIME: No admission date for patient encounter.    PCP: System, Provider Not In    ED PROVIDER: Jayro Hussein PA-C      Chief Complaint   Patient presents with    Abdominal Pain    Chest Pain    Eye Pain         FINAL IMPRESSION:  1. Abrasion of right cornea, initial encounter    2. LUQ abdominal pain    3. Generalized abdominal pain          ED COURSE & MEDICAL DECISION MAKING:    Pertinent Labs & Imaging studies reviewed. (See chart for details)  7:15 PM I met the patient and performed my initial interview and exam.   7:58 PM Eye exam preformed.   8:06 PM patient updated on laboratory results here in the emergency department and prescription sent to the pharmacy.    53 year old female presents to the Emergency Department for evaluation of abdominal pain, cramping.     ED Course as of 12/18/23 2006   Mon Dec 18, 2023   1929 Patient is a 53-year-old female, presents emergency department for evaluation of multiple complaints including left upper quadrant abdominal pain, as well as right eye pain.  Patient reports this has been going on for a few days as well.  She feels like something is stuck in her eye.  She denies any changes in her vision.  Denies taking any medications.  Her eyesight reportedly has not changed.  Neurologically otherwise intact.  Will evaluate for possible corneal abrasion given the foreign body sensation.  Suspect this is likely the case.  Will obtain basic labs here in the emergency department.  At this point I do not think she requires any emergent imaging, certainly inconsistent with pulmonary abnormalities.  Plan for basic labs, and reassessment.   1953 Fluorescein examination was performed in the emergency department, very diffuse amount of uptake, consistent with possible corneal abrasion, will send sample erythromycin ointment.  Suspect is  likely cause of eye pain.  No proptosis, no Paola sign   1956 Laboratory studies here in the emergency department do not show any acute abnormalities, no evidence of any abnormalities with the BMP, lipase, heart testing here is normal, CBC here unremarkable.  Patient without any significant exam findings, seems more consistent with possible constipation, or irritation within the bowel.  Certainly not febrile or tachycardic here, and exam fairly reassuring, do not think she warrants CT imaging at this point.  Suspect there may be some degree of gastritis, as well as some possible degree of cramping pain secondary to viral GI symptoms given recent diarrhea.  Laboratory studies here do not show any acute abnormalities, exam not warranting further CT imaging at this point.  Patient otherwise nontoxic-appearing.  Plan for discharge at this time.       Medical Decision Making    History:  Supplemental history from: Documented in chart, if applicable  External Record(s) reviewed: Outpatient Record: Outpatient telemedicine visit from 12/12/2023, physical medicine office visit from 36/30/2023    Work Up:  Chart documentation includes differential considered and any EKGs or imaging independently interpreted by provider, where specified.  In additional to work up documented, I considered the following work up: Documented in chart, if applicable.    External consultation:  Discussion of management with another provider: Documented in chart, if applicable    Complicating factors:  Care impacted by chronic illness: Mental Health and Other: Cannabis use disorder, bipolar, chronic abdominal pain  Care affected by social determinants of health: N/A    Disposition considerations: Discharge. I prescribed additional prescription strength medication(s) as charted. N/A.       At the conclusion of the encounter I discussed the results of all of the tests and the disposition. The questions were answered. The patient or family acknowledged  "understanding and was agreeable with the care plan.       0 minutes of critical care time     MEDICATIONS GIVEN IN THE EMERGENCY:  Medications   fluorescein (FUL-ALESSANDRA) ophthalmic strip 1 strip (has no administration in time range)   tetracaine (PONTOCAINE) 0.5 % ophthalmic solution 1 drop (has no administration in time range)   alum & mag hydroxide-simethicone (MAALOX) suspension 15 mL (has no administration in time range)       NEW PRESCRIPTIONS STARTED AT TODAY'S ER VISIT  New Prescriptions    ERYTHROMYCIN (ROMYCIN) 5 MG/GM OPHTHALMIC OINTMENT    Place 0.5 inches into the right eye at bedtime    OMEPRAZOLE (PRILOSEC) 20 MG DR CAPSULE    Take 1 capsule (20 mg) by mouth daily for 30 days          =================================================================    HPI    Patient information was obtained from: Patient     Use of : N/A ,        Fallon LISA Sandhu is a 53 year old female with a pertinent history of chronic abdominal pain who presents to this ED for evaluation of LUQ abdominal pain.  Patient reports that has been ongoing for some time.  She reports a \"heavy sensation\" to the left upper abdomen.  No fever cough cold or chills.  Denies any dysuria or hematuria.  No nausea or vomiting.  No changes in her medication.  Additionally complaining of some right-sided pain within her eye, as well as chest pain.  Patient was under the impression that her heart was actually located in her left upper abdomen, which I think is contributing to some of her anxiety here.  She does not complain of any other acute abnormalities here.    PAST MEDICAL HISTORY:  Past Medical History:   Diagnosis Date    Asthma     Depression     Migraines     Miscarriage     Pneumonia        PAST SURGICAL HISTORY:  Past Surgical History:   Procedure Laterality Date    APPENDECTOMY       SECTION      5x    CHOLECYSTECTOMY      RIGHT OOPHORECTOMY             CURRENT MEDICATIONS:    erythromycin (ROMYCIN) 5 MG/GM ophthalmic " ointment  omeprazole (PRILOSEC) 20 MG DR capsule  acetaminophen (TYLENOL EXTRA STRENGTH) 500 MG tablet  benzocaine (ANBESOL) 10 % gel  diazepam (VALIUM) 5 MG tablet  diazepam (VALIUM) 5 MG tablet  doxylamine (UNISON) 25 mg tablet  gabapentin (NEURONTIN) 300 MG capsule  gabapentin (NEURONTIN) 400 MG capsule  ibuprofen (ADVIL,MOTRIN) 600 MG tablet  omeprazole (PRILOSEC) 20 MG capsule  ondansetron (ZOFRAN ODT) 4 MG ODT tab  ondansetron (ZOFRAN) 4 MG tablet  QUEtiapine (SEROQUEL) 200 MG tablet  traMADol (ULTRAM) 50 mg tablet         ALLERGIES:  Allergies   Allergen Reactions    Ibuprofen Itching       FAMILY HISTORY:  Family History   Problem Relation Age of Onset    Hypertension Mother     Diabetes Mother     Hypertension Father     Diabetes Father        SOCIAL HISTORY:   Social History     Socioeconomic History    Marital status: Single   Tobacco Use    Smoking status: Every Day     Packs/day: 0.50     Years: 10.00     Additional pack years: 0.00     Total pack years: 5.00     Types: Cigarettes    Smokeless tobacco: Never   Substance and Sexual Activity    Alcohol use: No    Drug use: Yes     Types: Marijuana       VITALS:  /74   Pulse 85   Temp 96.9  F (36.1  C)   Resp 19   Wt 70.3 kg (155 lb)   SpO2 99%   BMI 26.61 kg/m      PHYSICAL EXAM    Physical Exam  Vitals and nursing note reviewed.   Constitutional:       General: She is not in acute distress.     Appearance: Normal appearance. She is normal weight. She is not toxic-appearing or diaphoretic.   HENT:      Right Ear: External ear normal.      Left Ear: External ear normal.   Eyes:      General: Lids are normal. Lids are everted, no foreign bodies appreciated. No visual field deficit.        Left eye: No foreign body.      Conjunctiva/sclera: Conjunctivae normal.   Cardiovascular:      Rate and Rhythm: Normal rate and regular rhythm.   Pulmonary:      Effort: Pulmonary effort is normal. No respiratory distress.      Breath sounds: No stridor. No  wheezing or rales.   Abdominal:      General: Abdomen is flat.      Palpations: Abdomen is soft.      Tenderness: There is abdominal tenderness in the left upper quadrant. There is no left CVA tenderness or guarding.   Neurological:      Mental Status: She is alert. Mental status is at baseline.         LAB:  All pertinent labs reviewed and interpreted.  Labs Ordered and Resulted from Time of ED Arrival to Time of ED Departure   BASIC METABOLIC PANEL - Abnormal       Result Value    Sodium 138      Potassium 3.8      Chloride 104      Carbon Dioxide (CO2) 23      Anion Gap 11      Urea Nitrogen 9.7      Creatinine 0.56      GFR Estimate >90      Calcium 10.2 (*)     Glucose 115 (*)    TROPONIN T, HIGH SENSITIVITY - Normal    Troponin T, High Sensitivity <6     LIPASE - Normal    Lipase 18     CBC WITH PLATELETS AND DIFFERENTIAL    WBC Count 6.1      RBC Count 4.94      Hemoglobin 15.1      Hematocrit 44.2      MCV 90      MCH 30.6      MCHC 34.2      RDW 12.5      Platelet Count 310      % Neutrophils 42      % Lymphocytes 49      % Monocytes 8      % Eosinophils 1      % Basophils 0      % Immature Granulocytes 0      NRBCs per 100 WBC 0      Absolute Neutrophils 2.5      Absolute Lymphocytes 3.0      Absolute Monocytes 0.5      Absolute Eosinophils 0.1      Absolute Basophils 0.0      Absolute Immature Granulocytes 0.0      Absolute NRBCs 0.0         RADIOLOGY:  Reviewed all pertinent imaging. Please see official radiology report.  No orders to display       EKG:    Performed at: Sinus Rhythm    Impression: Sinus     Rate: 76   Rhythm: Sinus   Axis: 53 40 49  GA Interval: 154  QRS Interval: 84  QTc Interval: 429  ST Changes: No ST elevation or depression  Comparison: When compared to previous from 2019, no acute changes    I have reviewed and interpreted the EKG(s) documented above along with Dr. Rosado, ED MD.    PROCEDURES:   None.     Jayro Hussein PA-C  Emergency Medicine  [facility]  [facility] EMERGENCY ROOM  [address] [facility] [address]  [phone]  Dept: [phone]       Jayro Hussein PA-C  12/18/23 2006

## 2024-01-04 LAB
ATRIAL RATE - MUSE: 76 BPM
DIASTOLIC BLOOD PRESSURE - MUSE: NORMAL MMHG
INTERPRETATION ECG - MUSE: NORMAL
P AXIS - MUSE: 53 DEGREES
PR INTERVAL - MUSE: 154 MS
QRS DURATION - MUSE: 84 MS
QT - MUSE: 382 MS
QTC - MUSE: 429 MS
R AXIS - MUSE: 40 DEGREES
SYSTOLIC BLOOD PRESSURE - MUSE: NORMAL MMHG
T AXIS - MUSE: 49 DEGREES
VENTRICULAR RATE- MUSE: 76 BPM

## 2024-06-10 ENCOUNTER — OFFICE VISIT (OUTPATIENT)
Dept: FAMILY MEDICINE | Facility: CLINIC | Age: 54
End: 2024-06-10
Payer: MEDICARE

## 2024-06-10 VITALS
RESPIRATION RATE: 18 BRPM | OXYGEN SATURATION: 96 % | HEART RATE: 105 BPM | TEMPERATURE: 97.3 F | SYSTOLIC BLOOD PRESSURE: 106 MMHG | DIASTOLIC BLOOD PRESSURE: 76 MMHG

## 2024-06-10 DIAGNOSIS — R07.0 THROAT PAIN: ICD-10-CM

## 2024-06-10 DIAGNOSIS — N93.9 VAGINAL BLEEDING: ICD-10-CM

## 2024-06-10 DIAGNOSIS — J02.0 STREP THROAT: Primary | ICD-10-CM

## 2024-06-10 DIAGNOSIS — R10.84 ABDOMINAL PAIN, GENERALIZED: ICD-10-CM

## 2024-06-10 DIAGNOSIS — N30.00 ACUTE CYSTITIS WITHOUT HEMATURIA: ICD-10-CM

## 2024-06-10 LAB
ALBUMIN UR-MCNC: 100 MG/DL
APPEARANCE UR: ABNORMAL
BACTERIA #/AREA URNS HPF: ABNORMAL /HPF
BASOPHILS # BLD AUTO: 0 10E3/UL (ref 0–0.2)
BASOPHILS NFR BLD AUTO: 0 %
BILIRUB UR QL STRIP: ABNORMAL
COLOR UR AUTO: YELLOW
DEPRECATED S PYO AG THROAT QL EIA: POSITIVE
EOSINOPHIL # BLD AUTO: 0.1 10E3/UL (ref 0–0.7)
EOSINOPHIL NFR BLD AUTO: 1 %
ERYTHROCYTE [DISTWIDTH] IN BLOOD BY AUTOMATED COUNT: 12.7 % (ref 10–15)
GLUCOSE UR STRIP-MCNC: NEGATIVE MG/DL
HCG UR QL: NEGATIVE
HCT VFR BLD AUTO: 40.1 % (ref 35–47)
HGB BLD-MCNC: 13.9 G/DL (ref 11.7–15.7)
HGB UR QL STRIP: ABNORMAL
IMM GRANULOCYTES # BLD: 0.1 10E3/UL
IMM GRANULOCYTES NFR BLD: 1 %
KETONES UR STRIP-MCNC: 80 MG/DL
LEUKOCYTE ESTERASE UR QL STRIP: ABNORMAL
LYMPHOCYTES # BLD AUTO: 3 10E3/UL (ref 0.8–5.3)
LYMPHOCYTES NFR BLD AUTO: 32 %
MCH RBC QN AUTO: 32.2 PG (ref 26.5–33)
MCHC RBC AUTO-ENTMCNC: 34.7 G/DL (ref 31.5–36.5)
MCV RBC AUTO: 93 FL (ref 78–100)
MONOCYTES # BLD AUTO: 0.7 10E3/UL (ref 0–1.3)
MONOCYTES NFR BLD AUTO: 8 %
NEUTROPHILS # BLD AUTO: 5.4 10E3/UL (ref 1.6–8.3)
NEUTROPHILS NFR BLD AUTO: 58 %
NITRATE UR QL: NEGATIVE
PH UR STRIP: 6 [PH] (ref 5–8)
PLATELET # BLD AUTO: 306 10E3/UL (ref 150–450)
RBC # BLD AUTO: 4.32 10E6/UL (ref 3.8–5.2)
RBC #/AREA URNS AUTO: ABNORMAL /HPF
SP GR UR STRIP: 1.01 (ref 1–1.03)
SQUAMOUS #/AREA URNS AUTO: ABNORMAL /LPF
TRANS CELLS #/AREA URNS HPF: ABNORMAL /HPF
UROBILINOGEN UR STRIP-ACNC: 0.2 E.U./DL
WBC # BLD AUTO: 9.2 10E3/UL (ref 4–11)
WBC #/AREA URNS AUTO: ABNORMAL /HPF

## 2024-06-10 PROCEDURE — 87086 URINE CULTURE/COLONY COUNT: CPT | Mod: GZ | Performed by: PHYSICIAN ASSISTANT

## 2024-06-10 PROCEDURE — 87880 STREP A ASSAY W/OPTIC: CPT | Performed by: PHYSICIAN ASSISTANT

## 2024-06-10 PROCEDURE — 87186 SC STD MICRODIL/AGAR DIL: CPT | Performed by: PHYSICIAN ASSISTANT

## 2024-06-10 PROCEDURE — 99214 OFFICE O/P EST MOD 30 MIN: CPT | Performed by: PHYSICIAN ASSISTANT

## 2024-06-10 PROCEDURE — 81025 URINE PREGNANCY TEST: CPT | Performed by: PHYSICIAN ASSISTANT

## 2024-06-10 PROCEDURE — 80053 COMPREHEN METABOLIC PANEL: CPT | Performed by: PHYSICIAN ASSISTANT

## 2024-06-10 PROCEDURE — 81001 URINALYSIS AUTO W/SCOPE: CPT | Performed by: PHYSICIAN ASSISTANT

## 2024-06-10 PROCEDURE — 85025 COMPLETE CBC W/AUTO DIFF WBC: CPT | Performed by: PHYSICIAN ASSISTANT

## 2024-06-10 PROCEDURE — 36415 COLL VENOUS BLD VENIPUNCTURE: CPT | Performed by: PHYSICIAN ASSISTANT

## 2024-06-10 RX ORDER — CEFDINIR 300 MG/1
300 CAPSULE ORAL 2 TIMES DAILY
Qty: 14 CAPSULE | Refills: 0 | Status: SHIPPED | OUTPATIENT
Start: 2024-06-10 | End: 2024-06-17
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
## 2024-06-10 NOTE — PATIENT INSTRUCTIONS
STREP:  1) Increase rest and fluid intake.  2) Take Tylenol as needed for fever.   3) Strep infection is considered contagious until treated for 24 hours, avoid attending work or public spaces during contagious period.  4) Complete full course of antibiotics.   5) Replace toothbrush after being on the antibiotic for 48 hours to avoid reinfection   6) Return if not resolved in one week or sooner if worsening.    Urinary Tract Infection:  1. Increase fluid intake  2. Complete antibiotic regimen as prescribed. You will be notified if the treatment plan needs to be changed based on the urine culture results.   3. Follow if you have a fever of 100.4 F (38 C) or higher, no improvement after three days of treatment, trouble urinating because of pain,new or increased discharge from the vagina, rash or joint pain, Increased back or abdominal pain.    I placed a referral to follow up with an OB/GYN for further evaluation of your vaginal bleeding. At this time your hemoglobin is stable. You should receive a phone call in the next 2 business days for help scheduling this appointment.

## 2024-06-11 LAB
ALBUMIN SERPL BCG-MCNC: 4.2 G/DL (ref 3.5–5.2)
ALP SERPL-CCNC: 108 U/L (ref 40–150)
ALT SERPL W P-5'-P-CCNC: 44 U/L (ref 0–50)
ANION GAP SERPL CALCULATED.3IONS-SCNC: 16 MMOL/L (ref 7–15)
AST SERPL W P-5'-P-CCNC: 43 U/L (ref 0–45)
BILIRUB SERPL-MCNC: 0.8 MG/DL
BUN SERPL-MCNC: 9 MG/DL (ref 6–20)
CALCIUM SERPL-MCNC: 9.4 MG/DL (ref 8.6–10)
CHLORIDE SERPL-SCNC: 100 MMOL/L (ref 98–107)
CREAT SERPL-MCNC: 0.72 MG/DL (ref 0.51–0.95)
DEPRECATED HCO3 PLAS-SCNC: 21 MMOL/L (ref 22–29)
EGFRCR SERPLBLD CKD-EPI 2021: >90 ML/MIN/1.73M2
GLUCOSE SERPL-MCNC: 73 MG/DL (ref 70–99)
POTASSIUM SERPL-SCNC: 3.2 MMOL/L (ref 3.4–5.3)
PROT SERPL-MCNC: 7.2 G/DL (ref 6.4–8.3)
SODIUM SERPL-SCNC: 137 MMOL/L (ref 135–145)

## 2024-06-12 LAB — BACTERIA UR CULT: ABNORMAL

## 2024-06-13 ENCOUNTER — APPOINTMENT (OUTPATIENT)
Dept: CT IMAGING | Facility: CLINIC | Age: 54
End: 2024-06-13
Attending: EMERGENCY MEDICINE
Payer: MEDICARE

## 2024-06-13 ENCOUNTER — HOSPITAL ENCOUNTER (EMERGENCY)
Facility: CLINIC | Age: 54
Discharge: HOME | End: 2024-06-13
Admitting: EMERGENCY MEDICINE
Payer: MEDICARE

## 2024-06-13 VITALS
TEMPERATURE: 98.5 F | SYSTOLIC BLOOD PRESSURE: 146 MMHG | RESPIRATION RATE: 20 BRPM | BODY MASS INDEX: 30.04 KG/M2 | DIASTOLIC BLOOD PRESSURE: 68 MMHG | HEART RATE: 97 BPM | OXYGEN SATURATION: 99 % | WEIGHT: 175 LBS

## 2024-06-13 DIAGNOSIS — N30.00 ACUTE CYSTITIS WITHOUT HEMATURIA: ICD-10-CM

## 2024-06-13 DIAGNOSIS — R11.2 NAUSEA AND VOMITING: ICD-10-CM

## 2024-06-13 LAB
ALBUMIN SERPL BCG-MCNC: 3.7 G/DL (ref 3.5–5.2)
ALBUMIN UR-MCNC: NEGATIVE MG/DL
ALP SERPL-CCNC: 89 U/L (ref 40–150)
ALT SERPL W P-5'-P-CCNC: 21 U/L (ref 0–50)
ANION GAP SERPL CALCULATED.3IONS-SCNC: 13 MMOL/L (ref 7–15)
APPEARANCE UR: CLEAR
AST SERPL W P-5'-P-CCNC: 27 U/L (ref 0–45)
BILIRUB SERPL-MCNC: 0.3 MG/DL
BILIRUB UR QL STRIP: NEGATIVE
BUN SERPL-MCNC: 7.1 MG/DL (ref 6–20)
CALCIUM SERPL-MCNC: 9.7 MG/DL (ref 8.6–10)
CHLORIDE SERPL-SCNC: 105 MMOL/L (ref 98–107)
COLOR UR AUTO: ABNORMAL
CREAT SERPL-MCNC: 0.71 MG/DL (ref 0.51–0.95)
DEPRECATED HCO3 PLAS-SCNC: 23 MMOL/L (ref 22–29)
EGFRCR SERPLBLD CKD-EPI 2021: >90 ML/MIN/1.73M2
ERYTHROCYTE [DISTWIDTH] IN BLOOD BY AUTOMATED COUNT: 12.7 % (ref 10–15)
GLUCOSE SERPL-MCNC: 84 MG/DL (ref 70–99)
GLUCOSE UR STRIP-MCNC: NEGATIVE MG/DL
HCG INTACT+B SERPL-ACNC: 2 MIU/ML
HCT VFR BLD AUTO: 40.5 % (ref 35–47)
HGB BLD-MCNC: 13.6 G/DL (ref 11.7–15.7)
HGB UR QL STRIP: NEGATIVE
KETONES UR STRIP-MCNC: ABNORMAL MG/DL
LEUKOCYTE ESTERASE UR QL STRIP: NEGATIVE
LIPASE SERPL-CCNC: 21 U/L (ref 13–60)
MCH RBC QN AUTO: 30.9 PG (ref 26.5–33)
MCHC RBC AUTO-ENTMCNC: 33.6 G/DL (ref 31.5–36.5)
MCV RBC AUTO: 92 FL (ref 78–100)
MUCOUS THREADS #/AREA URNS LPF: PRESENT /LPF
NITRATE UR QL: NEGATIVE
PH UR STRIP: 6 [PH] (ref 5–7)
PLATELET # BLD AUTO: 314 10E3/UL (ref 150–450)
POTASSIUM SERPL-SCNC: 3.6 MMOL/L (ref 3.4–5.3)
PROT SERPL-MCNC: 6.7 G/DL (ref 6.4–8.3)
RBC # BLD AUTO: 4.4 10E6/UL (ref 3.8–5.2)
RBC URINE: <1 /HPF
SODIUM SERPL-SCNC: 141 MMOL/L (ref 135–145)
SP GR UR STRIP: 1.02 (ref 1–1.03)
SQUAMOUS EPITHELIAL: 1 /HPF
UROBILINOGEN UR STRIP-MCNC: <2 MG/DL
WBC # BLD AUTO: 6.3 10E3/UL (ref 4–11)
WBC URINE: 2 /HPF

## 2024-06-13 PROCEDURE — 258N000003 HC RX IP 258 OP 636: Performed by: EMERGENCY MEDICINE

## 2024-06-13 PROCEDURE — G1010 CDSM STANSON: HCPCS

## 2024-06-13 PROCEDURE — 96375 TX/PRO/DX INJ NEW DRUG ADDON: CPT

## 2024-06-13 PROCEDURE — 96361 HYDRATE IV INFUSION ADD-ON: CPT

## 2024-06-13 PROCEDURE — 250N000011 HC RX IP 250 OP 636: Mod: JZ | Performed by: EMERGENCY MEDICINE

## 2024-06-13 PROCEDURE — 36415 COLL VENOUS BLD VENIPUNCTURE: CPT | Performed by: EMERGENCY MEDICINE

## 2024-06-13 PROCEDURE — 81001 URINALYSIS AUTO W/SCOPE: CPT | Performed by: EMERGENCY MEDICINE

## 2024-06-13 PROCEDURE — 83690 ASSAY OF LIPASE: CPT | Performed by: EMERGENCY MEDICINE

## 2024-06-13 PROCEDURE — 85027 COMPLETE CBC AUTOMATED: CPT | Performed by: EMERGENCY MEDICINE

## 2024-06-13 PROCEDURE — 99285 EMERGENCY DEPT VISIT HI MDM: CPT | Mod: 25

## 2024-06-13 PROCEDURE — 96374 THER/PROPH/DIAG INJ IV PUSH: CPT | Mod: 59

## 2024-06-13 PROCEDURE — 74177 CT ABD & PELVIS W/CONTRAST: CPT | Mod: MG

## 2024-06-13 PROCEDURE — 80053 COMPREHEN METABOLIC PANEL: CPT | Performed by: EMERGENCY MEDICINE

## 2024-06-13 PROCEDURE — 84702 CHORIONIC GONADOTROPIN TEST: CPT | Performed by: EMERGENCY MEDICINE

## 2024-06-13 RX ORDER — KETOROLAC TROMETHAMINE 15 MG/ML
15 INJECTION, SOLUTION INTRAMUSCULAR; INTRAVENOUS ONCE
Status: COMPLETED | OUTPATIENT
Start: 2024-06-13 | End: 2024-06-13

## 2024-06-13 RX ORDER — ONDANSETRON 2 MG/ML
4 INJECTION INTRAMUSCULAR; INTRAVENOUS ONCE
Status: COMPLETED | OUTPATIENT
Start: 2024-06-13 | End: 2024-06-13

## 2024-06-13 RX ORDER — ONDANSETRON 4 MG/1
4 TABLET, ORALLY DISINTEGRATING ORAL EVERY 8 HOURS PRN
Qty: 10 TABLET | Refills: 0 | Status: SHIPPED | OUTPATIENT
Start: 2024-06-13 | End: 2024-06-16

## 2024-06-13 RX ORDER — IOPAMIDOL 755 MG/ML
90 INJECTION, SOLUTION INTRAVASCULAR ONCE
Status: COMPLETED | OUTPATIENT
Start: 2024-06-13 | End: 2024-06-13

## 2024-06-13 RX ADMIN — ONDANSETRON 4 MG: 2 INJECTION INTRAMUSCULAR; INTRAVENOUS at 19:34

## 2024-06-13 RX ADMIN — SODIUM CHLORIDE 1000 ML: 9 INJECTION, SOLUTION INTRAVENOUS at 19:35

## 2024-06-13 RX ADMIN — KETOROLAC TROMETHAMINE 15 MG: 15 INJECTION, SOLUTION INTRAMUSCULAR; INTRAVENOUS at 19:34

## 2024-06-13 RX ADMIN — IOPAMIDOL 90 ML: 755 INJECTION, SOLUTION INTRAVENOUS at 20:50

## 2024-06-13 ASSESSMENT — ENCOUNTER SYMPTOMS
DIARRHEA: 0
ABDOMINAL PAIN: 1
CHILLS: 0
FATIGUE: 1
VOMITING: 1
FEVER: 0
NAUSEA: 1
SORE THROAT: 1
DYSURIA: 1

## 2024-06-13 ASSESSMENT — ACTIVITIES OF DAILY LIVING (ADL)
ADLS_ACUITY_SCORE: 33
ADLS_ACUITY_SCORE: 35
ADLS_ACUITY_SCORE: 33
ADLS_ACUITY_SCORE: 33
ADLS_ACUITY_SCORE: 35

## 2024-06-13 ASSESSMENT — COLUMBIA-SUICIDE SEVERITY RATING SCALE - C-SSRS
2. HAVE YOU ACTUALLY HAD ANY THOUGHTS OF KILLING YOURSELF IN THE PAST MONTH?: NO
6. HAVE YOU EVER DONE ANYTHING, STARTED TO DO ANYTHING, OR PREPARED TO DO ANYTHING TO END YOUR LIFE?: NO
1. IN THE PAST MONTH, HAVE YOU WISHED YOU WERE DEAD OR WISHED YOU COULD GO TO SLEEP AND NOT WAKE UP?: NO

## 2024-06-13 NOTE — ED TRIAGE NOTES
Pt seen at walk in clinic 2 days ago for vomiting and diarrhea and dx with UTI and strep and started on antibiotics. Received call yesterday saying potassium low and [facility] and given 3 pills for potassium and nausea meds. Today still vomiting, still having pain. Called MD today and told to go to ER. Recent hx of 5 days of vaginal bleeding which hadn't had a period in almost 2 years.      Triage Assessment (Adult)       Row Name 06/13/24 144          Triage Assessment    Airway WDL WDL        Respiratory WDL    Respiratory WDL WDL        Skin Circulation/Temperature WDL    Skin Circulation/Temperature WDL WDL        Cardiac WDL    Cardiac WDL WDL        Peripheral/Neurovascular WDL    Peripheral Neurovascular WDL WDL        Cognitive/Neuro/Behavioral WDL    Cognitive/Neuro/Behavioral WDL WDL

## 2024-06-13 NOTE — ED PROVIDER NOTES
"EMERGENCY DEPARTMENT ENCOUNTER      NAME: Fallon Sandhu  AGE: 53 year old female  YOB: 1970  MRN: [id number]  EVALUATION DATE & TIME: No admission date for patient encounter.    PCP: System, Provider Not In    ED PROVIDER: Kaur Mclaughlin PA-C      Chief Complaint   Patient presents with    Abdominal Pain    Nausea, Vomiting, & Diarrhea         FINAL IMPRESSION:  1. Acute cystitis without hematuria    2. Nausea and vomiting          ED COURSE & MEDICAL DECISION MAKING:    Pertinent Labs & Imaging studies reviewed. (See chart for details)    53 year old female presents to the Emergency Department for evaluation of abdominal pain, vomiting, and vaginal bleeding.    Physical exam is remarkable for a generally well-appearing female who is in no acute distress.  Heart and lung sounds are clear diffusely throughout.  She has diffuse lower abdominal tenderness to palpation without rebound or guarding.  Dry mucous membranes.  Vital signs are stable and she is afebrile.    CBC is unremarkable with no leukocytosis or anemia.  CMP is unremarkable with no significant electrolyte derangements, normal liver and kidney function.  Lipase within normal limits.  Urinalysis without blood or infection.  Pregnancy test negative.  CT of the abdomen shows thickening of the bladder wall consistent with cystitis, otherwise unremarkable.    The patient was given IV fluids, Toradol, and Zofran with improvement in her symptoms.  I do not think any further emergent labs or imaging are indicated at this time.  She is overall well-appearing here and hemodynamically stable.  Her workup is reassuring without evidence of significant fluid loss or intra-abdominal/surgical pathology.  Advised her to continue taking her cefdinir as prescribed, no evidence of obstruction or pyelonephritis on her scan today.  Prescriptions for Zofran were sent to her pharmacy, patient also requested \"a capsule for my gas\"-she notes that she was " prescribed it during her last ER visit.  Chart review shows that she was prescribed Prilosec so a prescription was sent to the pharmacy for this as well.  Advised her to follow-up with her primary care provider for recheck; recommend follow-up with OB/GYN regarding the vaginal bleeding she was reporting as her CT findings today do not indicate why she would be having vaginal bleeding 2 years after her last menstrual period.  She states she has an appointment on July 12 which I advised her to keep.  Recommend return here for any new or worsening symptoms.  The patient is agreeable with this treatment plan and verbalized her understanding.    Medical Decision Making    History:  Supplemental history from: Documented in chart  External Record(s) reviewed: Outpatient Record: Multiple recent urgent care visits for the same symptoms    Work Up:  Chart documentation includes differential considered and any EKGs or imaging independently interpreted by provider, where specified.  In additional to work up documented, I considered the following work up: Documented in chart, if applicable.    External consultation:  Discussion of management with another provider: Documented in chart, if applicable    Complicating factors:  Care impacted by chronic illness: Mental Health  Care affected by social determinants of health: Access to Affordable Health Care    Disposition considerations: Discharge. I prescribed additional prescription strength medication(s) as charted. I considered admission, but ultimately discharged patient after reassuring labs and imaging.    ED Course   4:46 PM Performed my initial history and physical exam. Discussed workup in the emergency department, management of symptoms, and likely disposition.   9:36 PM Rechecked patient.  I discussed the plan for discharge with the patient or family and they are agreeable.. We discussed supportive cares at home and reasons for return to the ER including new or worsening  symptoms - all questions and concerns addressed. Patient to be discharged by RN.    At the conclusion of the encounter I discussed the results of all of the tests and the disposition. The questions were answered. The patient or family acknowledged understanding and was agreeable with the care plan.     Voice recognition software was used in the creation of this note. Any grammatical or nonsensical errors are due to inherent errors with the software and are not the intention of the writer.     MEDICATIONS GIVEN IN THE EMERGENCY:  Medications   sodium chloride 0.9% BOLUS 1,000 mL (0 mLs Intravenous Stopped 6/13/24 2108)   ketorolac (TORADOL) injection 15 mg (15 mg Intravenous $Given 6/13/24 1934)   ondansetron (ZOFRAN) injection 4 mg (4 mg Intravenous $Given 6/13/24 1934)   iopamidol (ISOVUE-370) solution 90 mL (90 mLs Intravenous $Given 6/13/24 2050)       NEW PRESCRIPTIONS STARTED AT TODAY'S ER VISIT  Discharge Medication List as of 6/13/2024  9:52 PM        START taking these medications    Details   !! omeprazole (PRILOSEC) 20 MG DR capsule Take 1 capsule (20 mg) by mouth daily for 30 days, Disp-30 capsule, R-1, E-Prescribe      !! ondansetron (ZOFRAN ODT) 4 MG ODT tab Take 1 tablet (4 mg) by mouth every 8 hours as needed, Disp-10 tablet, R-0, E-Prescribe       !! - Potential duplicate medications found. Please discuss with provider.               =================================================================    HPI    Patient information was obtained from: Patient    Use of : N/A         Fallon Sandhu is a 53 year old female with PMH of asthma, depression, migraines who presents to the ED via walk-in for evaluation of abdominal pain.     The patient presents today with multiple complaints.  Her primary complaint is that for the last 4 days, she has had persistent abdominal pain, nausea, vomiting, and sore throat.  She was seen in urgent care multiple times and given oral antibiotics to treat UTI  "and strep throat, oral Zofran, and potassium replacement which she states have not been improving her symptoms.  The abdominal pain is in her lower abdomen and she describes it as sharp.  She has tried Tylenol for the pain without improvement.    The patient also reports that 9 days ago, she had vaginal bleeding that lasted for 5 days.  She states it was \"like a miscarriage\" but she has not been sexually active for many years.  Her last menstrual period was 2 years ago.      REVIEW OF SYSTEMS   Review of Systems   Constitutional:  Positive for fatigue. Negative for chills and fever.   HENT:  Positive for sore throat.    Gastrointestinal:  Positive for abdominal pain, nausea and vomiting. Negative for diarrhea.   Genitourinary:  Positive for dysuria and vaginal bleeding.       All other systems reviewed and are negative unless noted in HPI.      PAST MEDICAL HISTORY:  Past Medical History:   Diagnosis Date    Asthma     Depression     Migraines     Miscarriage     Pneumonia        PAST SURGICAL HISTORY:  Past Surgical History:   Procedure Laterality Date    APPENDECTOMY       SECTION      5x    CHOLECYSTECTOMY      RIGHT OOPHORECTOMY         CURRENT MEDICATIONS:    acetaminophen (TYLENOL EXTRA STRENGTH) 500 MG tablet  cefdinir (OMNICEF) 300 MG capsule  diazepam (VALIUM) 5 MG tablet  gabapentin (NEURONTIN) 400 MG capsule  omeprazole (PRILOSEC) 20 MG capsule  omeprazole (PRILOSEC) 20 MG DR capsule  ondansetron (ZOFRAN ODT) 4 MG ODT tab  ondansetron (ZOFRAN ODT) 4 MG ODT tab  QUEtiapine (SEROQUEL) 200 MG tablet        ALLERGIES:  Allergies   Allergen Reactions    Ibuprofen Itching       FAMILY HISTORY:  Family History   Problem Relation Age of Onset    Hypertension Mother     Diabetes Mother     Hypertension Father     Diabetes Father        SOCIAL HISTORY:   Social History     Socioeconomic History    Marital status: Single   Tobacco Use    Smoking status: Every Day     Current packs/day: 0.50     Average " packs/day: 0.5 packs/day for 10.0 years (5.0 ttl pk-yrs)     Types: Cigarettes    Smokeless tobacco: Never   Substance and Sexual Activity    Alcohol use: No    Drug use: Yes     Types: Marijuana     Social Determinants of Health     Financial Resource Strain: Not on File (2019)    Received from [facility]     Financial Resource Strain     Financial Resource Strain: 0   Food Insecurity: Not on File (2019)    Received from [facility]     Food Insecurity     Food: 0   Transportation Needs: Not on File (2019)    Received from [facility]     Transportation Needs     Transportation: 0   Physical Activity: Not on File (2019)    Received from [facility]  [employer] [facility]     Physical Activity     Physical Activity: 0   Stress: Not on File (2019)    Received from [facility]     Stress     Stress: 0   Social Connections: Not on File (2019)    Received from [facility]     Social Connections     Social Connections and Isolation: 0   Housing Stability: Not on File (2019)    Received from [facility]     Housing Stability     Housin       VITALS:  Patient Vitals for the past 24 hrs:   BP Temp Temp src Pulse Resp SpO2 Weight   245 (!) 146/68 -- -- -- -- -- --   24 1943 (!) 181/86 -- -- -- -- -- --   24 1730 139/78 -- -- -- -- -- --   24 1715 136/74 -- -- -- -- -- --   24 1446 (!) 122/92 -- -- -- -- -- --   24 1441 -- 98.5  F (36.9  C) Oral 97 20 99 % 79.4 kg (175 lb)       PHYSICAL EXAM    VITAL SIGNS: BP (!) 146/68   Pulse 97   Temp 98.5  F (36.9  C) (Oral)   Resp 20   Wt 79.4 kg (175 lb)   SpO2 99%   BMI 30.04 kg/m    General Appearance: Alert, cooperative, normal speech and facial symmetry, appears stated age, the patient does not appear in distress  Head:  Normocephalic, without obvious abnormality, atraumatic  Eyes: Conjunctiva/corneas clear, EOM's intact, no nystagmus, PERRL  ENT: Dry lips;  mucosa and tongue normal; teeth and gums  normal, no pharyngeal inflammation, no dysphonia or difficulty swallowing, membranes are moist without pallor  Cardio:  Regular rate and rhythm, S1 and S2 normal, no murmur, rub    or gallop, 2+ pulses symmetric in all extremities  Pulm:  Clear to auscultation bilaterally, respirations unlabored with no accessory muscle use  Abdomen: Diffuse lower abdominal tenderness to palpation; Active bowel sounds in all quadrants; abdomen is soft, non-distended with no rebound tenderness or guarding.   Extremities:  Moves all extremities  Neuro: Patient is awake, alert, and responsive to voice. No gross motor weaknesses or sensory loss; moves all extremities.    LAB:  All pertinent labs reviewed and interpreted.  Labs Ordered and Resulted from Time of ED Arrival to Time of ED Departure   ROUTINE UA WITH MICROSCOPIC REFLEX TO CULTURE - Abnormal       Result Value    Color Urine Light Yellow      Appearance Urine Clear      Glucose Urine Negative      Bilirubin Urine Negative      Ketones Urine Trace (*)     Specific Gravity Urine 1.018      Blood Urine Negative      pH Urine 6.0      Protein Albumin Urine Negative      Urobilinogen Urine <2.0      Nitrite Urine Negative      Leukocyte Esterase Urine Negative      Mucus Urine Present (*)     RBC Urine <1      WBC Urine 2      Squamous Epithelials Urine 1     CBC WITH PLATELETS - Normal    WBC Count 6.3      RBC Count 4.40      Hemoglobin 13.6      Hematocrit 40.5      MCV 92      MCH 30.9      MCHC 33.6      RDW 12.7      Platelet Count 314     COMPREHENSIVE METABOLIC PANEL - Normal    Sodium 141      Potassium 3.6      Carbon Dioxide (CO2) 23      Anion Gap 13      Urea Nitrogen 7.1      Creatinine 0.71      GFR Estimate >90      Calcium 9.7      Chloride 105      Glucose 84      Alkaline Phosphatase 89      AST 27      ALT 21      Protein Total 6.7      Albumin 3.7      Bilirubin Total 0.3     LIPASE - Normal    Lipase 21     HCG QUANTITATIVE PREGNANCY - Normal    hCG  Quantitative 2         RADIOLOGY:  Reviewed all pertinent imaging. Please see official radiology report.  CT Abdomen Pelvis w Contrast   Final Result   IMPRESSION:    1.  Since 7/12/2022, there is new significant circumferential thickening involving the bladder worrisome for cystitis and a urinalysis would be of benefit.      2.  Fused pelvic kidneys.      3.  Anterior abdominal wall fatty hernias with no inflammation or bowel.      4.  Changes of avascular necrosis in both femoral heads with no evidence for collapse.       5.  Mild fatty infiltration of the liver.            Kaur Mclaughlin PA-C  Emergency Medicine  [facility] EMERGENCY ROOM  [address] [facility] [id number] [phone]  Dept: [phone]       Kaur Mclaughlin PA-C  06/13/24 9402

## 2024-06-14 NOTE — ED NOTES
Pt remains A&O at discharge, she is declining discharge vitals at this time. Vomiting is improved and she is agreeable with plan to discharge home with outpt follow up. Stable at discharge.

## 2024-06-14 NOTE — MEDICATION SCRIBE - ADMISSION MEDICATION HISTORY
Medication Scribe Admission Medication History    Admission medication history is complete. The information provided in this note is only as accurate as the sources available at the time of the update.    Information Source(s): Patient and CareEverywhere/SureScripts via in-person    Pertinent Information: Patient is currently on cefdinir and had the last dose yesterday evening. This was started 6/10. Was also prescribed three days of potassium and finished that yesterday evening. Ran out of omeprazole and ondansetron, stating she would like more of those as well as potassium. Reports no food for 4-5 days. Has been taking one ondansetron each time the patient vomited.     Changes made to PTA medication list:  Added: None  Deleted:   Diazepam 5 mg (duplicate)  Doxylamine 25 mg   Erythromycin 5 mg/g ophthalmic ointment - done  Gabapentin 300 mg - on 400 mg  Ibuprofen 600 mg  Tramadol 50 mg - no fills in last year  Changed: None    Allergies reviewed with patient and updates made in EHR: yes    Medication History Completed By: Jayme Luciano 6/13/2024 9:19 PM    PTA Med List   Medication Sig Note Last Dose    acetaminophen (TYLENOL EXTRA STRENGTH) 500 MG tablet [ACETAMINOPHEN (TYLENOL EXTRA STRENGTH) 500 MG TABLET] Take 2 tablets (1,000 mg total) by mouth every 6 (six) hours as needed for pain.  6/12/2024 at PM    cefdinir (OMNICEF) 300 MG capsule Take 1 capsule (300 mg) by mouth 2 times daily for 7 days 6/13/2024: Started 6/10 6/12/2024 at PM    diazepam (VALIUM) 5 MG tablet Take 1 tab 2 hrs before MRI, take 1 tab 1 hr before MRI only if needed  Has not taken any. MRI only    gabapentin (NEURONTIN) 400 MG capsule Take 1 capsule (400 mg) by mouth 3 times daily  Past Week at last week    omeprazole (PRILOSEC) 20 MG capsule [OMEPRAZOLE (PRILOSEC) 20 MG CAPSULE] Take 1 capsule (20 mg total) by mouth daily before breakfast.  Past Month at about a month ago    ondansetron (ZOFRAN ODT) 4 MG ODT tab Take 1 tablet (4 mg) by  mouth every 8 hours as needed  6/13/2024 at Pm; just before admission    QUEtiapine (SEROQUEL) 200 MG tablet Take 200 mg by mouth at bedtime  6/12/2024 at HS

## 2024-06-14 NOTE — ED NOTES
Pt is alert and oriented; she reports that she is feeling better after meds and some fluids. She is aware of plan for CT scan and waiting on lab results. Call light is within reach and lights are dimmed for pt comfort.

## 2024-06-14 NOTE — DISCHARGE INSTRUCTIONS
You were seen here today for evaluation of nausea and vomiting.  Your lab work today is reassuring with no evidence of significant infection in your blood, liver or kidney dysfunction, or electrolyte problems.  Your CT scan was essentially normal other than some mild thickening of your bladder which is consistent with your known bladder infection.    Continue taking your antibiotics as previously prescribed.  I will prescribe you Zofran to take every 8 hours for nausea/vomiting.  I also prescribed you Prilosec to help with your acid reflux as you requested.    Follow-up with your OB/GYN as planned on July 12.  Return here for any new or worsening symptoms including severe pain, fever over 100.4  F, persistent vomiting, black or bloody stools or vomit, or any other symptoms that concern you.

## 2024-08-20 ENCOUNTER — OFFICE VISIT (OUTPATIENT)
Dept: FAMILY MEDICINE | Facility: CLINIC | Age: 54
End: 2024-08-20
Payer: MEDICARE

## 2024-08-20 VITALS
SYSTOLIC BLOOD PRESSURE: 131 MMHG | OXYGEN SATURATION: 98 % | RESPIRATION RATE: 18 BRPM | DIASTOLIC BLOOD PRESSURE: 74 MMHG | HEART RATE: 80 BPM | TEMPERATURE: 97.7 F

## 2024-08-20 DIAGNOSIS — J32.9 CHRONIC RHINOSINUSITIS: ICD-10-CM

## 2024-08-20 DIAGNOSIS — J34.89 SORE IN NOSE: Primary | ICD-10-CM

## 2024-08-20 PROCEDURE — 99214 OFFICE O/P EST MOD 30 MIN: CPT | Performed by: NURSE PRACTITIONER

## 2024-08-20 RX ORDER — MUPIROCIN 20 MG/G
OINTMENT TOPICAL 2 TIMES DAILY
Qty: 22 G | Refills: 0 | Status: SHIPPED | OUTPATIENT
Start: 2024-08-20 | End: 2024-08-30

## 2024-08-20 RX ORDER — FLUTICASONE PROPIONATE 50 MCG
1 SPRAY, SUSPENSION (ML) NASAL DAILY
Qty: 16 G | Refills: 0 | Status: SHIPPED | OUTPATIENT
Start: 2024-08-20 | End: 2024-09-19

## 2024-08-20 NOTE — PROGRESS NOTES
Chief Complaint   Patient presents with    Sinus Problem     States has on going sinus  problem nasal discharge odor in nose     SUBJECTIVE:  Fallon Sandhu is a 53 year old female who presents today with ethmoid sinus pain pressure rhinorrhea congestion raw nostrils sores loss of smell for many years.  She has tried OTC nasal sprays without relief.  Occasionally uses allergy meds.  Antibiotics have not helped in the past.  She saw ENT many years ago and had a scope done which was unremarkable.  Would like to see a nasal specialist.  Declines any use of irritants inhalation injury or prior drug use such as cocaine.  No fever sweats chills.    Past Medical History:   Diagnosis Date    Asthma     Depression     Migraines     Miscarriage     Pneumonia      Current Outpatient Medications   Medication Sig Dispense Refill    fluticasone (FLONASE) 50 MCG/ACT nasal spray Spray 1 spray into both nostrils daily for 30 days 16 g 0    gabapentin (NEURONTIN) 400 MG capsule Take 1 capsule (400 mg) by mouth 3 times daily 90 capsule 1    mupirocin (BACTROBAN) 2 % external ointment Apply topically 2 times daily for 10 days 22 g 0    QUEtiapine (SEROQUEL) 200 MG tablet Take 200 mg by mouth at bedtime      acetaminophen (TYLENOL EXTRA STRENGTH) 500 MG tablet [ACETAMINOPHEN (TYLENOL EXTRA STRENGTH) 500 MG TABLET] Take 2 tablets (1,000 mg total) by mouth every 6 (six) hours as needed for pain. (Patient not taking: Reported on 8/20/2024)  0    diazepam (VALIUM) 5 MG tablet Take 1 tab 2 hrs before MRI, take 1 tab 1 hr before MRI only if needed (Patient not taking: Reported on 8/20/2024) 2 tablet 0    omeprazole (PRILOSEC) 20 MG capsule [OMEPRAZOLE (PRILOSEC) 20 MG CAPSULE] Take 1 capsule (20 mg total) by mouth daily before breakfast. (Patient not taking: Reported on 8/20/2024) 30 capsule 0    ondansetron (ZOFRAN ODT) 4 MG ODT tab Take 1 tablet (4 mg) by mouth every 8 hours as needed (Patient not taking: Reported on 8/20/2024) 20 tablet  0     Social History     Tobacco Use    Smoking status: Every Day     Current packs/day: 0.50     Average packs/day: 0.5 packs/day for 10.0 years (5.0 ttl pk-yrs)     Types: Cigarettes    Smokeless tobacco: Never   Substance Use Topics    Alcohol use: No     Allergies   Allergen Reactions    Ibuprofen Itching     Tolerated toradol in ED 06/13/24       Review of Systems  All systems negative except for those listed above in HPI.    OBJECTIVE:  /74   Pulse 80   Temp 97.7  F (36.5  C) (Oral)   Resp 18   SpO2 98%   Physical Exam  Vitals reviewed.   Constitutional:       General: She is not in acute distress.     Appearance: Normal appearance. She is well-developed. She is not ill-appearing, toxic-appearing or diaphoretic.   HENT:      Head: Normocephalic and atraumatic.      Right Ear: Tympanic membrane and ear canal normal.      Left Ear: Tympanic membrane and ear canal normal.      Nose: Congestion and rhinorrhea present.      Comments: Bilateral nares erythematous raw dry with open lesions     Mouth/Throat:      Pharynx: No oropharyngeal exudate or posterior oropharyngeal erythema.   Eyes:      Conjunctiva/sclera: Conjunctivae normal.      Pupils: Pupils are equal, round, and reactive to light.   Cardiovascular:      Rate and Rhythm: Normal rate.      Pulses: Normal pulses.   Pulmonary:      Effort: Pulmonary effort is normal. No respiratory distress.   Musculoskeletal:         General: Normal range of motion.      Cervical back: Normal range of motion and neck supple.   Lymphadenopathy:      Cervical: No cervical adenopathy.   Skin:     General: Skin is warm.      Capillary Refill: Capillary refill takes less than 2 seconds.      Findings: No rash.   Neurological:      General: No focal deficit present.      Mental Status: She is alert and oriented to person, place, and time.   Psychiatric:         Mood and Affect: Mood normal.         Behavior: Behavior normal.       ASSESSMENT:    ICD-10-CM    1. Sore in  nose  J34.89 mupirocin (BACTROBAN) 2 % external ointment     Adult ENT  Referral      2. Chronic rhinosinusitis  J32.9 fluticasone (FLONASE) 50 MCG/ACT nasal spray     Adult ENT  Referral        PLAN:     Zyrtec flonase bactroban ointment  Warm humidified air  ENT follow-up  No signs of bacterial etiology today: fever, headache, purulent discharge    Flonase (fluticasone) 2 sprays in each nostril daily until symptoms resolve, then continue 1 spray in each nostril for at least 5 more days.  Take Tylenol or an NSAID such as ibuprofen or naproxen as needed for pain.  May use netti pot with bottled or distilled water and saline packets to flush sinuses.  Sudafed (pseudoephedrine) behind the pharmacist counter for 3-5 days helps relieve congestion.  Afrin (oxymetazoline) nasal spray twice daily for 3 days. Stop after 3 days.  Mucinex (guiafenesin) thins mucus and may help it to loosen more quickly  Saline drops or nasal sprays may loosen mucus.  Sit in the bathroom with the door closed and hot shower running to loosen mucus.  Contact primary care clinic if you do not have any relief from your symptoms after 10 days.  Present to emergency room for significantly increasing pain, persistent high fever >102F, swelling/redness around your eyes, changes in your vision or ability to move your eyes, altered mental status or a severe headache.    Follow up with primary care provider with any problems, questions or concerns or if symptoms worsen or fail to improve. Patient agreed to plan and verbalized understanding.    FARZAD [facility]

## 2024-08-20 NOTE — PATIENT INSTRUCTIONS
Zyrtec flonase bactroban ointment  Warm humidified air  ENT follow-up  No signs of bacterial etiology today: fever, headache, purulent discharge

## 2024-10-15 ENCOUNTER — APPOINTMENT (OUTPATIENT)
Dept: CT IMAGING | Facility: CLINIC | Age: 54
End: 2024-10-15
Attending: FAMILY MEDICINE
Payer: MEDICARE

## 2024-10-15 ENCOUNTER — HOSPITAL ENCOUNTER (EMERGENCY)
Facility: CLINIC | Age: 54
Discharge: HOME | End: 2024-10-15
Attending: FAMILY MEDICINE | Admitting: FAMILY MEDICINE
Payer: MEDICARE

## 2024-10-15 VITALS
SYSTOLIC BLOOD PRESSURE: 136 MMHG | HEART RATE: 78 BPM | OXYGEN SATURATION: 99 % | TEMPERATURE: 97.1 F | DIASTOLIC BLOOD PRESSURE: 85 MMHG | WEIGHT: 180 LBS | RESPIRATION RATE: 25 BRPM | BODY MASS INDEX: 30.73 KG/M2 | HEIGHT: 64 IN

## 2024-10-15 DIAGNOSIS — G89.4 CHRONIC PAIN DISORDER: ICD-10-CM

## 2024-10-15 DIAGNOSIS — Q63.2 PELVIC KIDNEY: ICD-10-CM

## 2024-10-15 DIAGNOSIS — R11.2 NAUSEA AND VOMITING, UNSPECIFIED VOMITING TYPE: ICD-10-CM

## 2024-10-15 DIAGNOSIS — R10.84 GENERALIZED ABDOMINAL PAIN: ICD-10-CM

## 2024-10-15 LAB
ALBUMIN SERPL BCG-MCNC: 4.4 G/DL (ref 3.5–5.2)
ALBUMIN UR-MCNC: 30 MG/DL
ALP SERPL-CCNC: 106 U/L (ref 40–150)
ALT SERPL W P-5'-P-CCNC: 34 U/L (ref 0–50)
ANION GAP SERPL CALCULATED.3IONS-SCNC: 16 MMOL/L (ref 7–15)
APPEARANCE UR: CLEAR
AST SERPL W P-5'-P-CCNC: 40 U/L (ref 0–45)
ATRIAL RATE - MUSE: 75 BPM
BASOPHILS # BLD AUTO: 0 10E3/UL (ref 0–0.2)
BASOPHILS NFR BLD AUTO: 0 %
BILIRUB DIRECT SERPL-MCNC: <0.2 MG/DL (ref 0–0.3)
BILIRUB SERPL-MCNC: 0.5 MG/DL
BILIRUB UR QL STRIP: NEGATIVE
BUN SERPL-MCNC: 11.1 MG/DL (ref 6–20)
CALCIUM SERPL-MCNC: 9.2 MG/DL (ref 8.8–10.4)
CHLORIDE SERPL-SCNC: 103 MMOL/L (ref 98–107)
COLOR UR AUTO: ABNORMAL
CREAT SERPL-MCNC: 0.73 MG/DL (ref 0.51–0.95)
DIASTOLIC BLOOD PRESSURE - MUSE: 79 MMHG
EGFRCR SERPLBLD CKD-EPI 2021: >90 ML/MIN/1.73M2
EOSINOPHIL # BLD AUTO: 0.1 10E3/UL (ref 0–0.7)
EOSINOPHIL NFR BLD AUTO: 1 %
ERYTHROCYTE [DISTWIDTH] IN BLOOD BY AUTOMATED COUNT: 12.8 % (ref 10–15)
GLUCOSE SERPL-MCNC: 140 MG/DL (ref 70–99)
GLUCOSE UR STRIP-MCNC: NEGATIVE MG/DL
HCO3 SERPL-SCNC: 21 MMOL/L (ref 22–29)
HCT VFR BLD AUTO: 45 % (ref 35–47)
HGB BLD-MCNC: 15.3 G/DL (ref 11.7–15.7)
HGB UR QL STRIP: NEGATIVE
IMM GRANULOCYTES # BLD: 0 10E3/UL
IMM GRANULOCYTES NFR BLD: 0 %
INTERPRETATION ECG - MUSE: NORMAL
KETONES UR STRIP-MCNC: 5 MG/DL
LEUKOCYTE ESTERASE UR QL STRIP: NEGATIVE
LIPASE SERPL-CCNC: 38 U/L (ref 13–60)
LYMPHOCYTES # BLD AUTO: 2.8 10E3/UL (ref 0.8–5.3)
LYMPHOCYTES NFR BLD AUTO: 39 %
MAGNESIUM SERPL-MCNC: 2.1 MG/DL (ref 1.7–2.3)
MCH RBC QN AUTO: 31 PG (ref 26.5–33)
MCHC RBC AUTO-ENTMCNC: 34 G/DL (ref 31.5–36.5)
MCV RBC AUTO: 91 FL (ref 78–100)
MONOCYTES # BLD AUTO: 0.5 10E3/UL (ref 0–1.3)
MONOCYTES NFR BLD AUTO: 6 %
MUCOUS THREADS #/AREA URNS LPF: PRESENT /LPF
NEUTROPHILS # BLD AUTO: 4 10E3/UL (ref 1.6–8.3)
NEUTROPHILS NFR BLD AUTO: 54 %
NITRATE UR QL: NEGATIVE
NRBC # BLD AUTO: 0 10E3/UL
NRBC BLD AUTO-RTO: 0 /100
P AXIS - MUSE: 46 DEGREES
PH UR STRIP: 6.5 [PH] (ref 5–7)
PLATELET # BLD AUTO: 296 10E3/UL (ref 150–450)
POTASSIUM SERPL-SCNC: 3.6 MMOL/L (ref 3.4–5.3)
PR INTERVAL - MUSE: 160 MS
PROT SERPL-MCNC: 7.6 G/DL (ref 6.4–8.3)
QRS DURATION - MUSE: 84 MS
QT - MUSE: 412 MS
QTC - MUSE: 460 MS
R AXIS - MUSE: 32 DEGREES
RBC # BLD AUTO: 4.94 10E6/UL (ref 3.8–5.2)
RBC URINE: 2 /HPF
SODIUM SERPL-SCNC: 140 MMOL/L (ref 135–145)
SP GR UR STRIP: <1.005 (ref 1–1.03)
SQUAMOUS EPITHELIAL: 1 /HPF
SYSTOLIC BLOOD PRESSURE - MUSE: 151 MMHG
T AXIS - MUSE: 32 DEGREES
TROPONIN T SERPL HS-MCNC: <6 NG/L
UROBILINOGEN UR STRIP-MCNC: <2 MG/DL
VENTRICULAR RATE- MUSE: 75 BPM
WBC # BLD AUTO: 7.4 10E3/UL (ref 4–11)
WBC URINE: <1 /HPF

## 2024-10-15 PROCEDURE — 250N000011 HC RX IP 250 OP 636: Performed by: FAMILY MEDICINE

## 2024-10-15 PROCEDURE — 83690 ASSAY OF LIPASE: CPT | Performed by: FAMILY MEDICINE

## 2024-10-15 PROCEDURE — 82248 BILIRUBIN DIRECT: CPT | Performed by: FAMILY MEDICINE

## 2024-10-15 PROCEDURE — 80048 BASIC METABOLIC PNL TOTAL CA: CPT | Performed by: FAMILY MEDICINE

## 2024-10-15 PROCEDURE — 250N000013 HC RX MED GY IP 250 OP 250 PS 637: Performed by: FAMILY MEDICINE

## 2024-10-15 PROCEDURE — 85004 AUTOMATED DIFF WBC COUNT: CPT | Performed by: FAMILY MEDICINE

## 2024-10-15 PROCEDURE — 74177 CT ABD & PELVIS W/CONTRAST: CPT | Mod: MA

## 2024-10-15 PROCEDURE — 84484 ASSAY OF TROPONIN QUANT: CPT | Performed by: FAMILY MEDICINE

## 2024-10-15 PROCEDURE — 93005 ELECTROCARDIOGRAM TRACING: CPT | Performed by: FAMILY MEDICINE

## 2024-10-15 PROCEDURE — 96375 TX/PRO/DX INJ NEW DRUG ADDON: CPT

## 2024-10-15 PROCEDURE — 96374 THER/PROPH/DIAG INJ IV PUSH: CPT | Mod: 59

## 2024-10-15 PROCEDURE — 96376 TX/PRO/DX INJ SAME DRUG ADON: CPT

## 2024-10-15 PROCEDURE — 82947 ASSAY GLUCOSE BLOOD QUANT: CPT | Performed by: FAMILY MEDICINE

## 2024-10-15 PROCEDURE — 81003 URINALYSIS AUTO W/O SCOPE: CPT | Performed by: FAMILY MEDICINE

## 2024-10-15 PROCEDURE — 99285 EMERGENCY DEPT VISIT HI MDM: CPT | Mod: 25

## 2024-10-15 PROCEDURE — 83735 ASSAY OF MAGNESIUM: CPT | Performed by: FAMILY MEDICINE

## 2024-10-15 PROCEDURE — 36415 COLL VENOUS BLD VENIPUNCTURE: CPT | Performed by: FAMILY MEDICINE

## 2024-10-15 RX ORDER — OXYCODONE HYDROCHLORIDE 5 MG/1
5 TABLET ORAL ONCE
Status: COMPLETED | OUTPATIENT
Start: 2024-10-15 | End: 2024-10-15

## 2024-10-15 RX ORDER — KETOROLAC TROMETHAMINE 15 MG/ML
15 INJECTION, SOLUTION INTRAMUSCULAR; INTRAVENOUS ONCE
Status: COMPLETED | OUTPATIENT
Start: 2024-10-15 | End: 2024-10-15

## 2024-10-15 RX ORDER — IOPAMIDOL 755 MG/ML
90 INJECTION, SOLUTION INTRAVASCULAR ONCE
Status: COMPLETED | OUTPATIENT
Start: 2024-10-15 | End: 2024-10-15

## 2024-10-15 RX ORDER — ONDANSETRON 2 MG/ML
4 INJECTION INTRAMUSCULAR; INTRAVENOUS ONCE
Status: COMPLETED | OUTPATIENT
Start: 2024-10-15 | End: 2024-10-15

## 2024-10-15 RX ORDER — SUCRALFATE 1 G/1
1 TABLET ORAL 4 TIMES DAILY
Qty: 28 TABLET | Refills: 0 | Status: SHIPPED | OUTPATIENT
Start: 2024-10-15

## 2024-10-15 RX ORDER — ONDANSETRON 4 MG/1
4 TABLET, ORALLY DISINTEGRATING ORAL EVERY 6 HOURS PRN
Qty: 20 TABLET | Refills: 0 | Status: SHIPPED | OUTPATIENT
Start: 2024-10-15 | End: 2024-10-18

## 2024-10-15 RX ORDER — DICYCLOMINE HCL 20 MG
20 TABLET ORAL 4 TIMES DAILY PRN
Qty: 20 TABLET | Refills: 0 | Status: SHIPPED | OUTPATIENT
Start: 2024-10-15 | End: 2024-10-25

## 2024-10-15 RX ADMIN — OXYCODONE HYDROCHLORIDE 5 MG: 5 TABLET ORAL at 13:41

## 2024-10-15 RX ADMIN — ONDANSETRON 4 MG: 2 INJECTION INTRAMUSCULAR; INTRAVENOUS at 10:28

## 2024-10-15 RX ADMIN — FAMOTIDINE 20 MG: 10 INJECTION, SOLUTION INTRAVENOUS at 10:27

## 2024-10-15 RX ADMIN — ONDANSETRON 4 MG: 2 INJECTION INTRAMUSCULAR; INTRAVENOUS at 13:41

## 2024-10-15 RX ADMIN — IOPAMIDOL 90 ML: 755 INJECTION, SOLUTION INTRAVENOUS at 11:32

## 2024-10-15 RX ADMIN — KETOROLAC TROMETHAMINE 15 MG: 15 INJECTION, SOLUTION INTRAMUSCULAR; INTRAVENOUS at 10:27

## 2024-10-15 ASSESSMENT — ACTIVITIES OF DAILY LIVING (ADL)
ADLS_ACUITY_SCORE: 35

## 2024-10-15 NOTE — ED TRIAGE NOTES
Pt arrives to ED with c/o worsening generalized abdominal pain, nausea and vomiting that got worse last night. Pt states they have not been feeling well for the past three weeks. States she did have diarrhea weeks ago. Now endorses to constipation. Last bowel movement three days ago.      Triage Assessment (Adult)       Row Name 10/15/24 0943          Triage Assessment    Airway WDL WDL        Respiratory WDL    Respiratory WDL WDL        Skin Circulation/Temperature WDL    Skin Circulation/Temperature WDL WDL        Cardiac WDL    Cardiac WDL WDL        Peripheral/Neurovascular WDL    Peripheral Neurovascular WDL WDL        Cognitive/Neuro/Behavioral WDL    Cognitive/Neuro/Behavioral WDL WDL

## 2024-10-15 NOTE — Clinical Note
Fallon Sandhu was seen and treated in our emergency department on 10/15/2024.  She may return to work on 10/16/2024.       If you have any questions or concerns, please don't hesitate to call.      Tyrell Jim MD

## 2024-10-15 NOTE — ED PROVIDER NOTES
EMERGENCY DEPARTMENT ENCOUNTER      NAME: Fallon Sandhu  AGE: 54 year old female  YOB: 1970  MRN: [id number]  EVALUATION DATE & TIME: No admission date for patient encounter.    PCP: System, Provider Not In    ED PROVIDER: Tyrell Jim M.D.    Chief Complaint   Patient presents with    Abdominal Pain    Nausea & Vomiting       FINAL IMPRESSION:  1. Nausea and vomiting, unspecified vomiting type    2. Generalized abdominal pain    3. Pelvic kidney    4. Chronic pain disorder        ED COURSE & MEDICAL DECISION MAKING:    Pertinent Labs & Imaging studies independently interpreted by me. (See chart for details)  Reviewed recent psychiatric telemedicine visit from September 2024 when patient was continued on Seroquel, ongoing cannabis use at that time and also cigarette smoking.    ED Course as of 10/17/24 1759   Tue Oct 15, 2024   0919 I met with the patient, obtained history, performed an initial exam, and discussed options and plan for diagnostics and treatment here in the ED.     0923 Patient seen and examined, presents today complaining of 3 weeks of vomiting, was having some diarrhea but that has resolved, as well as abdominal pain.  She notes pain in the upper abdomen and also lower abdomen, it sounds like the pain in the lower abdomen/suprapubic area has been ongoing.  Says she has not talked to her doctor about these complaints.  Also complains of left shoulder, upper chest, and trapezius pain with movement.  On exam here, patient is awake alert, vitally stable, left upper chest and left shoulder trapezius tenderness as well as diffuse abdominal tenderness worse in the epigastric right lower abdomen.  Labs are ordered.  Will defer imaging initially, if labs are abnormal, consider CT abdomen and pelvis for further evaluation.   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   1355 Urinalysis independently interpreted by me with no evidence for infection, microscopic hematuria.  Patient stable for discharge with outpatient follow-up   1412 Patient rechecked, discussed findings and plan today.  Stable for discharge         At the conclusion of the encounter I discussed the results of all of the tests and the disposition. The questions were answered. The patient or family acknowledged understanding and was agreeable with the care plan.     Medical Decision Making  Obtained supplemental history:Supplemental history obtained?: No  Reviewed external records: External records reviewed?: No  Care impacted by chronic illness:Alcohol and/or Drug Abuse or Dependence, Mental Health, and Smoking / Nicotine Use  Did you consider but not order tests?: Work up considered but not performed and documented in chart, if applicable  Did you interpret images independently?: Independent interpretation of ECG and images noted in documentation, when applicable.  Consultation discussion with other provider:Did you involve another provider (consultant, MH, pharmacy, etc.)?: No  Discharge. I prescribed additional prescription strength medication(s) as charted. I considered admission, but discharged patient after significant clinical improvement.    MIPS: Not Applicable      MEDICATIONS GIVEN IN THE EMERGENCY:  Medications   ondansetron (ZOFRAN) injection 4 mg (4 mg Intravenous $Given 10/15/24 1028)   famotidine (PEPCID) injection 20 mg (20 mg Intravenous  $Given 10/15/24 1027)   ketorolac (TORADOL) injection 15 mg (15 mg Intravenous $Given 10/15/24 1027)   iopamidol (ISOVUE-370) solution 90 mL (90 mLs Intravenous $Given 10/15/24 1132)   ondansetron (ZOFRAN) injection 4 mg (4 mg Intravenous $Given 10/15/24 1341)   oxyCODONE (ROXICODONE) tablet 5 mg (5 mg Oral $Given 10/15/24 1341)       NEW PRESCRIPTIONS STARTED AT TODAY'S ER VISIT  Discharge Medication List as of 10/15/2024  2:22 PM        START taking these medications    Details   dicyclomine (BENTYL) 20 MG tablet Take 1 tablet (20 mg) by mouth 4 times daily as needed (abdominal pain)., Disp-20 tablet, R-0, E-Prescribe      sucralfate (CARAFATE) 1 GM tablet Take 1 tablet (1 g) by mouth 4 times daily., Disp-28 tablet, R-0, E-Prescribe             =================================================================    HPI    Patient information was obtained from: patient       Fallon Sandhu is a 54 year old female with a pertinent history of schizophrenia, depression, bipolar 1 disorder, abdominal pain, tobacco use disorder, cannabis use disorder, chronic pain disorder, congential absence of kidney, who presents to this ED walking for evaluation of abdominal pain and vomiting.    Patient came to the ED after having 3 weeks of abdominal pain and multiple episodes of emesis with symptoms worsening over the past 24 hours. During this time she also had diarrhea but it has now turned into constipation, her last bowel movement was 3 days ago. She has pain in her right abdominal as well as chronic pain in her lower abdomen. Due to her symptoms she has had a decreased appetite for the last few months. Patient also reports left shoulder pain but denies any trauma or fall. She has not seen a provider for her symptoms. She smokes cigarettes. Patient denies hematemesis, alcohol consumption, or chance of pregnancy.      REVIEW OF SYSTEMS   Review of Systems   All other systems reviewed and negative    PAST MEDICAL HISTORY:  Past  Medical History:   Diagnosis Date    Asthma     Depression     Migraines     Miscarriage     Pneumonia        PAST SURGICAL HISTORY:  Past Surgical History:   Procedure Laterality Date    APPENDECTOMY       SECTION      5x    CHOLECYSTECTOMY      RIGHT OOPHORECTOMY         CURRENT MEDICATIONS:    No current facility-administered medications for this encounter.     Current Outpatient Medications   Medication Sig Dispense Refill    dicyclomine (BENTYL) 20 MG tablet Take 1 tablet (20 mg) by mouth 4 times daily as needed (abdominal pain). 20 tablet 0    ondansetron (ZOFRAN ODT) 4 MG ODT tab Take 1 tablet (4 mg) by mouth every 6 hours as needed for nausea. 20 tablet 0    ondansetron (ZOFRAN ODT) 4 MG ODT tab Take 1 tablet (4 mg) by mouth every 6 hours as needed for nausea. 20 tablet 0    sucralfate (CARAFATE) 1 GM tablet Take 1 tablet (1 g) by mouth 4 times daily. 28 tablet 0    acetaminophen (TYLENOL EXTRA STRENGTH) 500 MG tablet [ACETAMINOPHEN (TYLENOL EXTRA STRENGTH) 500 MG TABLET] Take 2 tablets (1,000 mg total) by mouth every 6 (six) hours as needed for pain. (Patient not taking: Reported on 2024)  0    diazepam (VALIUM) 5 MG tablet Take 1 tab 2 hrs before MRI, take 1 tab 1 hr before MRI only if needed (Patient not taking: Reported on 2024) 2 tablet 0    gabapentin (NEURONTIN) 400 MG capsule Take 1 capsule (400 mg) by mouth 3 times daily 90 capsule 1    omeprazole (PRILOSEC) 20 MG capsule [OMEPRAZOLE (PRILOSEC) 20 MG CAPSULE] Take 1 capsule (20 mg total) by mouth daily before breakfast. (Patient not taking: Reported on 2024) 30 capsule 0    QUEtiapine (SEROQUEL) 200 MG tablet Take 200 mg by mouth at bedtime         ALLERGIES:  Allergies   Allergen Reactions    Ibuprofen Itching     Tolerated toradol in ED 24       FAMILY HISTORY:  Family History   Problem Relation Age of Onset    Hypertension Mother     Diabetes Mother     Hypertension Father     Diabetes Father        SOCIAL HISTORY:  "  Social History     Socioeconomic History    Marital status: Single   Tobacco Use    Smoking status: Every Day     Current packs/day: 0.50     Average packs/day: 0.5 packs/day for 10.0 years (5.0 ttl pk-yrs)     Types: Cigarettes    Smokeless tobacco: Never   Substance and Sexual Activity    Alcohol use: No    Drug use: Yes     Types: Marijuana     Social Determinants of Health     Financial Resource Strain: Not on File (2019)    Received from [facility]    Financial Resource Strain     Financial Resource Strain: 0   Food Insecurity: Not on File (2019)    [facility] from [facility]    Food Insecurity     Food: 0   Transportation Needs: Not on File (2019)    Received from [facility]    Transportation Needs     Transportation: 0   Physical Activity: Not on File (2019)    Received from [facility]    Physical Activity     Physical Activity: 0   Stress: Not on File (2019)    Received from [facility]    Stress     Stress: 0   Social Connections: Not on File (2019)    Received from [facility]    Social Connections     Social Connections and Isolation: 0   Housing Stability: Not on File (2019)    Received from [facility]    Housing Stability     Housin       VITALS:  /85   Pulse 78   Temp 97.1  F (36.2  C) (Temporal)   Resp 25   Ht 1.626 m (5' 4\")   Wt 81.6 kg (180 lb)   SpO2 99%   BMI 30.90 kg/m      PHYSICAL EXAM:  Physical Exam  Vitals and nursing note reviewed.   Constitutional:       Appearance: Normal appearance.   HENT:      Head: Normocephalic and atraumatic.      Right Ear: External ear normal.      Left Ear: External ear normal.      Nose: Nose normal.      Mouth/Throat:      Mouth: Mucous membranes are moist.   Eyes:      Extraocular Movements: Extraocular movements intact.      Conjunctiva/sclera: Conjunctivae normal.      Pupils: Pupils are equal, round, and reactive to light.   Cardiovascular:      Rate and Rhythm: Normal rate and regular rhythm. "   Pulmonary:      Effort: Pulmonary effort is normal.      Breath sounds: Normal breath sounds. No wheezing or rales.   Abdominal:      General: Abdomen is flat. There is no distension.      Palpations: Abdomen is soft.      Tenderness: There is abdominal tenderness in the epigastric area and suprapubic area. There is no guarding.   Musculoskeletal:         General: Normal range of motion.      Cervical back: Normal range of motion and neck supple.      Right lower leg: No edema.      Left lower leg: No edema.   Lymphadenopathy:      Cervical: No cervical adenopathy.   Skin:     General: Skin is warm and dry.   Neurological:      General: No focal deficit present.      Mental Status: She is alert and oriented to person, place, and time. Mental status is at baseline.      Comments: No gross focal neurologic deficits   Psychiatric:         Mood and Affect: Mood normal.         Behavior: Behavior normal.         Thought Content: Thought content normal.          LAB:  All pertinent labs reviewed and interpreted.  Results for orders placed or performed during the hospital encounter of 10/15/24   CT Abdomen Pelvis w Contrast    Impression    IMPRESSION:   1.  No acute findings to explain the patient's pain.    2.  Small ventral abdominal wall hernias containing fat only without complication.    3.  Moderate diffuse hepatic steatosis.    4.  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   Basic metabolic panel   Result Value Ref Range    Sodium 140 135 - 145 mmol/L    Potassium 3.6 3.4 - 5.3 mmol/L    Chloride 103 98 - 107 mmol/L    Carbon Dioxide (CO2) 21 (L) 22 - 29 mmol/L    Anion Gap 16 (H) 7 - 15 mmol/L    Urea Nitrogen 11.1 6.0 - 20.0 mg/dL    Creatinine 0.73 0.51 - 0.95 mg/dL    GFR Estimate >90 >60 mL/min/1.73m2    Calcium 9.2 8.8 - 10.4 mg/dL    Glucose 140 (H) 70 - 99 mg/dL   Hepatic function panel   Result Value Ref Range    Protein Total 7.6 6.4 - 8.3 g/dL    Albumin 4.4 3.5 -  5.2 g/dL    Bilirubin Total 0.5 <=1.2 mg/dL    Alkaline Phosphatase 106 40 - 150 U/L    AST 40 0 - 45 U/L    ALT 34 0 - 50 U/L    Bilirubin Direct <0.20 0.00 - 0.30 mg/dL   Result Value Ref Range    Lipase 38 13 - 60 U/L   Result Value Ref Range    Magnesium 2.1 1.7 - 2.3 mg/dL   UA with Microscopic reflex to Culture    Specimen: Urine, Midstream   Result Value Ref Range    Color Urine Light Yellow Colorless, Straw, Light Yellow, Yellow    Appearance Urine Clear Clear    Glucose Urine Negative Negative mg/dL    Bilirubin Urine Negative Negative    Ketones Urine 5 (A) Negative mg/dL    Specific Gravity Urine <1.005 (L) 1.005 - 1.030    Blood Urine Negative Negative    pH Urine 6.5 5.0 - 7.0    Protein Albumin Urine 30 (A) Negative mg/dL    Urobilinogen Urine <2.0 <2.0 mg/dL    Nitrite Urine Negative Negative    Leukocyte Esterase Urine Negative Negative    Mucus Urine Present (A) None Seen /LPF    RBC Urine 2 <=2 /HPF    WBC Urine <1 <=5 /HPF    Squamous Epithelials Urine 1 <=1 /HPF   Result Value Ref Range    Troponin T, High Sensitivity <6 <=14 ng/L   CBC with platelets and differential   Result Value Ref Range    WBC Count 7.4 4.0 - 11.0 10e3/uL    RBC Count 4.94 3.80 - 5.20 10e6/uL    Hemoglobin 15.3 11.7 - 15.7 g/dL    Hematocrit 45.0 35.0 - 47.0 %    MCV 91 78 - 100 fL    MCH 31.0 26.5 - 33.0 pg    MCHC 34.0 31.5 - 36.5 g/dL    RDW 12.8 10.0 - 15.0 %    Platelet Count 296 150 - 450 10e3/uL    % Neutrophils 54 %    % Lymphocytes 39 %    % Monocytes 6 %    % Eosinophils 1 %    % Basophils 0 %    % Immature Granulocytes 0 %    NRBCs per 100 WBC 0 <1 /100    Absolute Neutrophils 4.0 1.6 - 8.3 10e3/uL    Absolute Lymphocytes 2.8 0.8 - 5.3 10e3/uL    Absolute Monocytes 0.5 0.0 - 1.3 10e3/uL    Absolute Eosinophils 0.1 0.0 - 0.7 10e3/uL    Absolute Basophils 0.0 0.0 - 0.2 10e3/uL    Absolute Immature Granulocytes 0.0 <=0.4 10e3/uL    Absolute NRBCs 0.0 10e3/uL   ECG 12-LEAD WITH MUSE ([facility])   Result Value Ref Range     Systolic Blood Pressure 151 mmHg    Diastolic Blood Pressure 79 mmHg    Ventricular Rate 75 BPM    Atrial Rate 75 BPM    LA Interval 160 ms    QRS Duration 84 ms     ms    QTc 460 ms    P Axis 46 degrees    R AXIS 32 degrees    T Axis 32 degrees    Interpretation ECG       Sinus rhythm  Normal ECG  When compared with ECG of 18-Dec-2023 18:48,  No significant change was found  Confirmed by SEE ED PROVIDER NOTE FOR, ECG INTERPRETATION ([id number]),  Ernestine Pretty ([id number]) on 10/15/2024 11:33:40 AM         RADIOLOGY:  Reviewed all pertinent imaging. Please see official radiology report.  CT Abdomen Pelvis w Contrast   Final Result   IMPRESSION:    1.  No acute findings to explain the patient's pain.      2.  Small ventral abdominal wall hernias containing fat only without complication.      3.  Moderate diffuse hepatic steatosis.      4.  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          I, Ly Bishopprosperlorin, am serving as a scribe to document services personally performed by Dr. Jim based on my observation and the provider's statements to me. I, Tyrell Jim MD attest that Ly Cuellar is acting in a scribe capacity, has observed my performance of the services and has documented them in accordance with my direction.    Tyrell Jim M.D.  Emergency Medicine  [facility] EMERGENCY ROOM  [address] [address] [phone]  Dept: [phone]       Tyrell Jim MD  10/17/24 2659

## 2024-12-13 ENCOUNTER — HOSPITAL ENCOUNTER (EMERGENCY)
Facility: CLINIC | Age: 54
Discharge: HOME | End: 2024-12-13
Admitting: EMERGENCY MEDICINE
Payer: MEDICARE

## 2024-12-13 ENCOUNTER — APPOINTMENT (OUTPATIENT)
Dept: CT IMAGING | Facility: CLINIC | Age: 54
End: 2024-12-13
Attending: EMERGENCY MEDICINE
Payer: MEDICARE

## 2024-12-13 VITALS
TEMPERATURE: 97.3 F | BODY MASS INDEX: 30.73 KG/M2 | DIASTOLIC BLOOD PRESSURE: 78 MMHG | SYSTOLIC BLOOD PRESSURE: 142 MMHG | RESPIRATION RATE: 24 BRPM | HEIGHT: 64 IN | OXYGEN SATURATION: 98 % | HEART RATE: 110 BPM | WEIGHT: 180 LBS

## 2024-12-13 DIAGNOSIS — N30.01 ACUTE CYSTITIS WITH HEMATURIA: ICD-10-CM

## 2024-12-13 LAB
ALBUMIN UR-MCNC: 70 MG/DL
ANION GAP SERPL CALCULATED.3IONS-SCNC: 15 MMOL/L (ref 7–15)
APPEARANCE UR: ABNORMAL
BACTERIA #/AREA URNS HPF: ABNORMAL /HPF
BASOPHILS # BLD AUTO: 0 10E3/UL (ref 0–0.2)
BASOPHILS NFR BLD AUTO: 0 %
BILIRUB UR QL STRIP: NEGATIVE
BUN SERPL-MCNC: 4.5 MG/DL (ref 6–20)
CALCIUM SERPL-MCNC: 9.8 MG/DL (ref 8.8–10.4)
CHLORIDE SERPL-SCNC: 102 MMOL/L (ref 98–107)
COLOR UR AUTO: ABNORMAL
CREAT SERPL-MCNC: 0.69 MG/DL (ref 0.51–0.95)
EGFRCR SERPLBLD CKD-EPI 2021: >90 ML/MIN/1.73M2
EOSINOPHIL # BLD AUTO: 0.1 10E3/UL (ref 0–0.7)
EOSINOPHIL NFR BLD AUTO: 1 %
ERYTHROCYTE [DISTWIDTH] IN BLOOD BY AUTOMATED COUNT: 13.1 % (ref 10–15)
GLUCOSE SERPL-MCNC: 131 MG/DL (ref 70–99)
GLUCOSE UR STRIP-MCNC: NEGATIVE MG/DL
HCO3 SERPL-SCNC: 22 MMOL/L (ref 22–29)
HCT VFR BLD AUTO: 41.8 % (ref 35–47)
HGB BLD-MCNC: 14.6 G/DL (ref 11.7–15.7)
HGB UR QL STRIP: ABNORMAL
HYALINE CASTS: 3 /LPF
IMM GRANULOCYTES # BLD: 0.1 10E3/UL
IMM GRANULOCYTES NFR BLD: 1 %
KETONES UR STRIP-MCNC: NEGATIVE MG/DL
LEUKOCYTE ESTERASE UR QL STRIP: ABNORMAL
LYMPHOCYTES # BLD AUTO: 3.2 10E3/UL (ref 0.8–5.3)
LYMPHOCYTES NFR BLD AUTO: 31 %
MCH RBC QN AUTO: 30.9 PG (ref 26.5–33)
MCHC RBC AUTO-ENTMCNC: 34.9 G/DL (ref 31.5–36.5)
MCV RBC AUTO: 89 FL (ref 78–100)
MONOCYTES # BLD AUTO: 0.6 10E3/UL (ref 0–1.3)
MONOCYTES NFR BLD AUTO: 6 %
NEUTROPHILS # BLD AUTO: 6.2 10E3/UL (ref 1.6–8.3)
NEUTROPHILS NFR BLD AUTO: 61 %
NITRATE UR QL: NEGATIVE
NRBC # BLD AUTO: 0 10E3/UL
NRBC BLD AUTO-RTO: 0 /100
PH UR STRIP: 6.5 [PH] (ref 5–7)
PLATELET # BLD AUTO: 302 10E3/UL (ref 150–450)
POTASSIUM SERPL-SCNC: 3.1 MMOL/L (ref 3.4–5.3)
RBC # BLD AUTO: 4.72 10E6/UL (ref 3.8–5.2)
RBC URINE: 1 /HPF
SODIUM SERPL-SCNC: 139 MMOL/L (ref 135–145)
SP GR UR STRIP: 1 (ref 1–1.03)
SQUAMOUS EPITHELIAL: <1 /HPF
UROBILINOGEN UR STRIP-MCNC: <2 MG/DL
WBC # BLD AUTO: 10.1 10E3/UL (ref 4–11)
WBC CLUMPS #/AREA URNS HPF: PRESENT /HPF
WBC URINE: >182 /HPF

## 2024-12-13 PROCEDURE — 87186 SC STD MICRODIL/AGAR DIL: CPT | Performed by: EMERGENCY MEDICINE

## 2024-12-13 PROCEDURE — 250N000011 HC RX IP 250 OP 636: Performed by: EMERGENCY MEDICINE

## 2024-12-13 PROCEDURE — 96372 THER/PROPH/DIAG INJ SC/IM: CPT | Performed by: EMERGENCY MEDICINE

## 2024-12-13 PROCEDURE — 36415 COLL VENOUS BLD VENIPUNCTURE: CPT | Performed by: EMERGENCY MEDICINE

## 2024-12-13 PROCEDURE — 82374 ASSAY BLOOD CARBON DIOXIDE: CPT | Performed by: EMERGENCY MEDICINE

## 2024-12-13 PROCEDURE — 80048 BASIC METABOLIC PNL TOTAL CA: CPT | Performed by: EMERGENCY MEDICINE

## 2024-12-13 PROCEDURE — 85004 AUTOMATED DIFF WBC COUNT: CPT | Performed by: EMERGENCY MEDICINE

## 2024-12-13 PROCEDURE — 74176 CT ABD & PELVIS W/O CONTRAST: CPT | Mod: MG

## 2024-12-13 PROCEDURE — 99285 EMERGENCY DEPT VISIT HI MDM: CPT | Mod: 25 | Performed by: EMERGENCY MEDICINE

## 2024-12-13 PROCEDURE — 81001 URINALYSIS AUTO W/SCOPE: CPT | Performed by: EMERGENCY MEDICINE

## 2024-12-13 PROCEDURE — 87086 URINE CULTURE/COLONY COUNT: CPT | Performed by: EMERGENCY MEDICINE

## 2024-12-13 RX ORDER — KETOROLAC TROMETHAMINE 15 MG/ML
15 INJECTION, SOLUTION INTRAMUSCULAR; INTRAVENOUS ONCE
Status: DISCONTINUED | OUTPATIENT
Start: 2024-12-13 | End: 2024-12-13

## 2024-12-13 RX ORDER — ONDANSETRON 4 MG/1
4 TABLET, ORALLY DISINTEGRATING ORAL EVERY 8 HOURS PRN
Qty: 10 TABLET | Refills: 0 | Status: SHIPPED | OUTPATIENT
Start: 2024-12-13 | End: 2024-12-16

## 2024-12-13 RX ORDER — CEPHALEXIN 500 MG/1
500 CAPSULE ORAL ONCE
Status: DISCONTINUED | OUTPATIENT
Start: 2024-12-13 | End: 2024-12-13

## 2024-12-13 RX ORDER — KETOROLAC TROMETHAMINE 30 MG/ML
30 INJECTION, SOLUTION INTRAMUSCULAR; INTRAVENOUS ONCE
Status: COMPLETED | OUTPATIENT
Start: 2024-12-13 | End: 2024-12-13

## 2024-12-13 RX ORDER — ONDANSETRON 4 MG/1
4 TABLET, ORALLY DISINTEGRATING ORAL ONCE
Status: COMPLETED | OUTPATIENT
Start: 2024-12-13 | End: 2024-12-13

## 2024-12-13 RX ORDER — CEPHALEXIN 500 MG/1
500 CAPSULE ORAL 3 TIMES DAILY
Qty: 21 CAPSULE | Refills: 0 | Status: SHIPPED | OUTPATIENT
Start: 2024-12-13 | End: 2024-12-20

## 2024-12-13 RX ADMIN — KETOROLAC TROMETHAMINE 30 MG: 30 INJECTION, SOLUTION INTRAMUSCULAR at 19:02

## 2024-12-13 RX ADMIN — ONDANSETRON 4 MG: 4 TABLET, ORALLY DISINTEGRATING ORAL at 19:02

## 2024-12-13 ASSESSMENT — COLUMBIA-SUICIDE SEVERITY RATING SCALE - C-SSRS
2. HAVE YOU ACTUALLY HAD ANY THOUGHTS OF KILLING YOURSELF IN THE PAST MONTH?: NO
1. IN THE PAST MONTH, HAVE YOU WISHED YOU WERE DEAD OR WISHED YOU COULD GO TO SLEEP AND NOT WAKE UP?: NO
6. HAVE YOU EVER DONE ANYTHING, STARTED TO DO ANYTHING, OR PREPARED TO DO ANYTHING TO END YOUR LIFE?: NO

## 2024-12-13 ASSESSMENT — ENCOUNTER SYMPTOMS
VOMITING: 0
FEVER: 0
BACK PAIN: 0
NAUSEA: 0
FREQUENCY: 1
FLANK PAIN: 0
HEMATURIA: 0
ABDOMINAL PAIN: 1
CHILLS: 0
DYSURIA: 1

## 2024-12-13 NOTE — ED TRIAGE NOTES
Pt c/o burning with urination and at rest with urgency. Pt has had these symptoms for the last 3 days without relief.     Triage Assessment (Adult)       Row Name 12/13/24 7888          Triage Assessment    Airway WDL WDL        Respiratory WDL    Respiratory WDL WDL        Skin Circulation/Temperature WDL    Skin Circulation/Temperature WDL WDL        Peripheral/Neurovascular WDL    Peripheral Neurovascular WDL WDL        Cognitive/Neuro/Behavioral WDL    Cognitive/Neuro/Behavioral WDL WDL

## 2024-12-13 NOTE — ED PROVIDER NOTES
EMERGENCY DEPARTMENT ENCOUNTER      NAME: Fallon Sandhu  AGE: 54 year old female  YOB: 1970  MRN: [id number]  EVALUATION DATE & TIME: No admission date for patient encounter.    PCP: System, Provider Not In    ED PROVIDER: Kaur Mclaughlin PA-C      Chief Complaint   Patient presents with    Rule out Urinary Tract Infection         FINAL IMPRESSION:  1. Acute cystitis with hematuria          ED COURSE & MEDICAL DECISION MAKING:    Pertinent Labs & Imaging studies reviewed. (See chart for details)    54 year old female presents to the Emergency Department for evaluation of dysuria.    Physical exam is remarkable for a generally well-appearing female who is in no acute distress.  She has diffuse lower abdominal tenderness to palpation without rebound or guarding.  No CVA tenderness. Heart and lung sounds are clear diffusely throughout. Vital signs initially remarkable for mild tachycardia which resolved after pain control, remainder are stable and she is afebrile.    CBC is unremarkable with no leukocytosis or anemia.  BMP is unremarkable with no significant electrolyte derangements, normal kidney function.  Urinalysis is consistent with infection with leukocyte esterase, bacteria, white blood cell clumps, and pyuria.  CT of the abdomen shows thickening of the bladder wall consistent with cystitis.  Chronic renal abnormalities noted without acute change.    The patient was given Toradol and Zofran here with improvement in her symptoms.  I do not think any further emergent labs or imaging are indicated at this time.  She is overall well-appearing here and hemodynamically stable.  She does not have any findings of pyelonephritis on CT scan, white blood cell count is reassuring and she has no new kidney dysfunction.  She is tolerating oral intake so we will plan to treat cystitis at home with oral antibiotics, prescriptions for Keflex and Zofran sent to her pharmacy.  Advised follow-up with her primary  care provider for recheck and return here for any new or worsening symptoms.  The patient is agreeable with this treatment plan and verbalized her understanding      Medical Decision Making  Obtained supplemental history:Supplemental history obtained?: No  Reviewed external records: External records reviewed?: Inpatient Record: ER visit for abdominal pain on 10/15/24  Care impacted by chronic illness:Mental Health  Care significantly affected by social determinants of health:Access to Medical Care  Did you consider but not order tests?: Work up considered but not performed and documented in chart, if applicable  Did you interpret images independently?: Independent interpretation of ECG and images noted in documentation, when applicable.  Consultation discussion with other provider:Did you involve another provider (consultant, , pharmacy, etc.)?: No  Discharge. I prescribed additional prescription strength medication(s) as charted. N/A.  Not Applicable      ED Course   4:17 PM Performed my initial history and physical exam. Discussed workup in the emergency department, management of symptoms, and likely disposition.   7:40 PM Rechecked patient. I discussed the plan for discharge with the patient or family and they are agreeable.. We discussed supportive cares at home and reasons for return to the ER including new or worsening symptoms - all questions and concerns addressed. Patient to be discharged by RN.    At the conclusion of the encounter I discussed the results of all of the tests and the disposition. The questions were answered. The patient or family acknowledged understanding and was agreeable with the care plan.     Voice recognition software was used in the creation of this note. Any grammatical or nonsensical errors are due to inherent errors with the software and are not the intention of the writer.     MEDICATIONS GIVEN IN THE EMERGENCY:  Medications   ketorolac (TORADOL) injection 30 mg (30 mg  Intramuscular $Given 24)   ondansetron (ZOFRAN ODT) ODT tab 4 mg (4 mg Oral $Given 24)       NEW PRESCRIPTIONS STARTED AT TODAY'S ER VISIT  New Prescriptions    CEPHALEXIN (KEFLEX) 500 MG CAPSULE    Take 1 capsule (500 mg) by mouth 3 times daily for 7 days.    ONDANSETRON (ZOFRAN ODT) 4 MG ODT TAB    Take 1 tablet (4 mg) by mouth every 8 hours as needed for nausea or vomiting.            =================================================================    HPI    Patient information was obtained from: Patient    Use of : N/A       Fallon Sandhu is a 54 year old female with PMH of schizophrenia, chronic pain disorder, asthma, pneumonia who presents to the ED via walk-in for evaluation of urinary symptoms.     The patient reports that for the last 3 days, she has had pain with urination, increased urinary urgency, and urinary frequency. She also endorses pain in her lower abdomen which is worse with urination. She has not tried any medications or treatments.     She denies fevers, chills, nausea, vomiting, flank/back pain.       REVIEW OF SYSTEMS   Review of Systems   Constitutional:  Negative for chills and fever.   Gastrointestinal:  Positive for abdominal pain. Negative for nausea and vomiting.   Genitourinary:  Positive for dysuria, frequency, pelvic pain and urgency. Negative for flank pain and hematuria.   Musculoskeletal:  Negative for back pain.       All other systems reviewed and are negative unless noted in HPI.      PAST MEDICAL HISTORY:  Past Medical History:   Diagnosis Date    Asthma     Depression     Migraines     Miscarriage     Pneumonia        PAST SURGICAL HISTORY:  Past Surgical History:   Procedure Laterality Date    APPENDECTOMY       SECTION      5x    CHOLECYSTECTOMY      RIGHT OOPHORECTOMY         CURRENT MEDICATIONS:    cephALEXin (KEFLEX) 500 MG capsule  ondansetron (ZOFRAN ODT) 4 MG ODT tab  acetaminophen (TYLENOL EXTRA STRENGTH) 500 MG  tablet  diazepam (VALIUM) 5 MG tablet  gabapentin (NEURONTIN) 400 MG capsule  omeprazole (PRILOSEC) 20 MG capsule  QUEtiapine (SEROQUEL) 200 MG tablet  sucralfate (CARAFATE) 1 GM tablet        ALLERGIES:  Allergies   Allergen Reactions    Ibuprofen Itching     Tolerated toradol in ED 24       FAMILY HISTORY:  Family History   Problem Relation Age of Onset    Hypertension Mother     Diabetes Mother     Hypertension Father     Diabetes Father        SOCIAL HISTORY:   Social History     Socioeconomic History    Marital status: Single     Spouse name: None    Number of children: None    Years of education: None    Highest education level: None   Tobacco Use    Smoking status: Every Day     Current packs/day: 0.50     Average packs/day: 0.5 packs/day for 10.0 years (5.0 ttl pk-yrs)     Types: Cigarettes    Smokeless tobacco: Never   Substance and Sexual Activity    Alcohol use: No    Drug use: Yes     Types: Marijuana     Social Drivers of Health     Financial Resource Strain: Not on File (2019)    Received from [facility]    Financial Resource Strain     Financial Resource Strain: 0   Food Insecurity: Not on File (2019)    Received from [facility]    Food Insecurity     Food: 0   Transportation Needs: Not on File (2019)    Received from [facility]    Transportation Needs     Transportation: 0   Physical Activity: Not on File (2019)    Received from [facility]    Physical Activity     Physical Activity: 0   Stress: Not on File (2019)    Received from [facility]    Stress     Stress: 0   Social Connections: Not on File (2019)    Received from [facility]    Social Connections     Social Connections and Isolation: 0   Housing Stability: Not on File (2019)    Received from [facility]    Housing Stability     Housin       VITALS:  Patient Vitals for the past 24 hrs:   BP Temp Temp src Pulse Resp SpO2 Height Weight   24 1555 (!) 142/78 97.3  F (36.3  C) Oral 110  "24 98 % 1.626 m (5' 4\") 81.6 kg (180 lb)       PHYSICAL EXAM    VITAL SIGNS: BP (!) 142/78   Pulse 110   Temp 97.3  F (36.3  C) (Oral)   Resp 24   Ht 1.626 m (5' 4\")   Wt 81.6 kg (180 lb)   SpO2 98%   BMI 30.90 kg/m    General Appearance: Alert, cooperative, normal speech and facial symmetry, appears stated age, the patient does not appear in distress  Head:  Normocephalic, without obvious abnormality, atraumatic  Cardio:  Regular rate and rhythm, S1 and S2 normal, no murmur, rub    or gallop, 2+ pulses symmetric in all extremities  Pulm:  Clear to auscultation bilaterally, respirations unlabored with no accessory muscle use  Abdomen:  Diffuse lower abdominal tenderness to palpation; Abdomen is soft, non-distended with no rebound tenderness or guarding.   Back: No CVA tenderness  Extremities: Moves all extremities  Neuro: Patient is awake, alert, and responsive to voice. No gross motor weaknesses or sensory loss; moves all extremities.    LAB:  All pertinent labs reviewed and interpreted.  Labs Ordered and Resulted from Time of ED Arrival to Time of ED Departure   ROUTINE UA WITH MICROSCOPIC REFLEX TO CULTURE - Abnormal       Result Value    Color Urine Light Yellow      Appearance Urine Turbid (*)     Glucose Urine Negative      Bilirubin Urine Negative      Ketones Urine Negative      Specific Gravity Urine 1.003      Blood Urine 1.0 mg/dL (*)     pH Urine 6.5      Protein Albumin Urine 70 (*)     Urobilinogen Urine <2.0      Nitrite Urine Negative      Leukocyte Esterase Urine 500 Jerzy/uL (*)     Bacteria Urine Few (*)     WBC Clumps Urine Present (*)     RBC Urine 1      WBC Urine >182 (*)     Squamous Epithelials Urine <1      Hyaline Casts Urine 3 (*)    BASIC METABOLIC PANEL - Abnormal    Sodium 139      Potassium 3.1 (*)     Chloride 102      Carbon Dioxide (CO2) 22      Anion Gap 15      Urea Nitrogen 4.5 (*)     Creatinine 0.69      GFR Estimate >90      Calcium 9.8      Glucose 131 (*)    CBC WITH " PLATELETS AND DIFFERENTIAL    WBC Count 10.1      RBC Count 4.72      Hemoglobin 14.6      Hematocrit 41.8      MCV 89      MCH 30.9      MCHC 34.9      RDW 13.1      Platelet Count 302      % Neutrophils 61      % Lymphocytes 31      % Monocytes 6      % Eosinophils 1      % Basophils 0      % Immature Granulocytes 1      NRBCs per 100 WBC 0      Absolute Neutrophils 6.2      Absolute Lymphocytes 3.2      Absolute Monocytes 0.6      Absolute Eosinophils 0.1      Absolute Basophils 0.0      Absolute Immature Granulocytes 0.1      Absolute NRBCs 0.0     URINE CULTURE       RADIOLOGY:  Reviewed all pertinent imaging. Please see official radiology report.  CT Abdomen Pelvis w/o Contrast   Final Result   IMPRESSION:       1.  Probable cystitis. Correlate with urinalysis.      2.  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 No urinary calculi or hydronephrosis.            Kaur Mclaughlin PA-C  Emergency Medicine  [facility] EMERGENCY ROOM  [address] [facility] [id number] [phone]  Dept: [phone]       Kaur Mclaughlin PA-C  12/13/24 1946

## 2024-12-14 LAB — BACTERIA UR CULT: ABNORMAL

## 2024-12-14 NOTE — DISCHARGE INSTRUCTIONS
You were seen here today for evaluation of pain with urination.  Your workup today does indicate a bladder infection called cystitis.  I will prescribe you antibiotics, take 3 times daily for 1 week.  Take Zofran every 8 hours as needed for nausea/vomiting.    Take Tylenol as needed for pain 1000 mg every 6-8 hours.  Do not take more than 4000 mg/day.    Follow-up with your primary care provider for recheck in the clinic next week.  Return to the emergency department for any new or worsening symptoms including severe pain, fever, persistent vomiting, inability to urinate, or any other symptoms that concern you.

## 2024-12-15 ENCOUNTER — TELEPHONE (OUTPATIENT)
Dept: NURSING | Facility: CLINIC | Age: 54
End: 2024-12-15
Payer: MEDICARE

## 2024-12-15 NOTE — LETTER
December 15, 2024        Fallon Sandhu  [address] [place]  [place] [address]          Dear Fallon Sandhu:    You were seen in the [facility] Emergency Department at [facility] on 12/15/2024.  We are unable to reach you by phone, so we are sending you this letter.     It is important that you call [facility] lab result nurse at [phone], as we have information to relay to you.  Best time to call back is between 9AM and 5:30PM, 7 days a week.      Sincerely,     [facility] Emergency Department Lab Result RN  [phone]

## 2024-12-15 NOTE — TELEPHONE ENCOUNTER
Second attempt to call Fallon she answered and was given urine culture results. She reports she is feeling better, only a little pain with urination and she has made an appt with her PCP

## 2024-12-15 NOTE — TELEPHONE ENCOUNTER
[facility]     Reason for call: Lab Result Notification     Lab Result (including Rx patient on, if applicable).  If culture, copy of lab report at bottom.  Lab Result: urine culture  cephALEXin (KEFLEX) 500 MG capsule 3 times daily for 7 days.   Susceptible   Creatinine Level (mg/dl)   Creatinine   Date Value Ref Range Status   12/13/2024 0.69 0.51 - 0.95 mg/dL Final    Creatinine clearance (ml/min), if applicable    Serum creatinine: 0.69 mg/dL 12/13/24 1746  Estimated creatinine clearance: 97.5 mL/min     Patient's current Symptoms:   Unable to assess, left message  ED symptoms= abdominal pain, dysuria, urinary frequency, urgency and pelvic pain  RN Recommendations/Instructions per [facility] ED lab result protocol:   [facility] ED lab result protocol utilized: urine culture    .    Fatimah Correa, RN

## 2024-12-27 ENCOUNTER — HOSPITAL ENCOUNTER (EMERGENCY)
Facility: CLINIC | Age: 54
Discharge: LEFT BEFORE DISCHARGE | End: 2024-12-27
Admitting: PHYSICIAN ASSISTANT
Payer: MEDICARE

## 2024-12-27 VITALS
RESPIRATION RATE: 17 BRPM | OXYGEN SATURATION: 95 % | HEART RATE: 107 BPM | DIASTOLIC BLOOD PRESSURE: 85 MMHG | WEIGHT: 163 LBS | BODY MASS INDEX: 27.83 KG/M2 | HEIGHT: 64 IN | SYSTOLIC BLOOD PRESSURE: 117 MMHG | TEMPERATURE: 97.7 F

## 2024-12-27 DIAGNOSIS — R10.2 SUPRAPUBIC ABDOMINAL PAIN: ICD-10-CM

## 2024-12-27 DIAGNOSIS — R11.2 NAUSEA AND VOMITING: ICD-10-CM

## 2024-12-27 PROCEDURE — 99282 EMERGENCY DEPT VISIT SF MDM: CPT

## 2024-12-27 RX ORDER — ONDANSETRON 2 MG/ML
4 INJECTION INTRAMUSCULAR; INTRAVENOUS ONCE
Status: DISCONTINUED | OUTPATIENT
Start: 2024-12-27 | End: 2024-12-27 | Stop reason: HOSPADM

## 2024-12-27 ASSESSMENT — COLUMBIA-SUICIDE SEVERITY RATING SCALE - C-SSRS
1. IN THE PAST MONTH, HAVE YOU WISHED YOU WERE DEAD OR WISHED YOU COULD GO TO SLEEP AND NOT WAKE UP?: NO
2. HAVE YOU ACTUALLY HAD ANY THOUGHTS OF KILLING YOURSELF IN THE PAST MONTH?: NO
6. HAVE YOU EVER DONE ANYTHING, STARTED TO DO ANYTHING, OR PREPARED TO DO ANYTHING TO END YOUR LIFE?: NO

## 2024-12-27 NOTE — ED PROVIDER NOTES
EMERGENCY DEPARTMENT ENCOUNTER      NAME: Fallon Sandhu  AGE: 54 year old female  YOB: 1970  MRN: [id number]  EVALUATION DATE & TIME: No admission date for patient encounter.    PCP: An[facility]    ED PROVIDER: Rubia Madera PA-C      Chief Complaint   Patient presents with    Abdominal Pain    Emesis         FINAL IMPRESSION:  No diagnosis found.      ED COURSE & MEDICAL DECISION MAKIN:07 PM I introduced myself to patient, performed initial HPI and examination.   Triage notes 1:45 PM and 2:11 PM reports call from darren, no answer. No labs obtained.     54 year old female with PMH chronic pain disorder, congenital absence of kidney, schizophrenia, bipolar, PTSD presents to the emergency department for evaluation of abdominal pain, nausea and vomiting with some associated diarrhea.  Reports this is similar to prior pain which she has been feeling intermittently for the past 6 months.  Most recently she was seen in this ED and diagnosed with UTI, CT otherwise negative.  Treated with Keflex.  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    Vital signs notable for mild tachycardia, afebrile.  Exam with mild suprapubic abdominal tenderness.  Given patient's location of kidney and history of UTI, my primary concern is for complicated UTI/pyelonephritis.    Plan for labs including UA, imaging deferred until labs resulted.  Clinically does not seem consistent with ureterolithiasis/infected stone.  Low suspicion for obstruction.  Did also consider gastroenteritis with community prevalence associated diarrhea which sounds different from prior pain.    Patient's workup was initially started from the waiting room given ED boarding crisis.  Patient ultimately he will left prior to having labs drawn.  Patient left without notification.    Medical Decision Making  Obtained supplemental history:Supplemental history obtained?: No  Reviewed external  records: External records reviewed?: No  Care impacted by chronic illness:Documented in Chart  Care significantly affected by social determinants of health:N/A  Did you consider but not order tests?: Work up considered but not performed and documented in chart, if applicable  Did you interpret images independently?: Independent interpretation of ECG and images noted in documentation, when applicable.  Consultation discussion with other provider:Did you involve another provider (consultant, , pharmacy, etc.)?: No  Admission considered but patient left prior to notification.  Not Applicable        MEDICATIONS GIVEN IN THE EMERGENCY:  Medications - No data to display    NEW PRESCRIPTIONS STARTED AT TODAY'S ER VISIT  New Prescriptions    No medications on file          =================================================================    HPI    Patient information was obtained from: Patient     Use of : N/A         Fallon Sandhu is a 54 year old female with a pertinent history of chronic pain disorder, congenital absence of kidney, schizophrenia, bipolar, PTSD who presents to this ED by private car for evaluation of abdominal pain.     Abdominal pain x 1 week, nausea and vomiting. + Diarrhea initially but now no bowel movement in the past few days. No dysuria. States pain feels similar to prior pain 1 month ago. Reports this has been a reoccurring issue    Many abdominal surgeries (C sections, appendectomy, cholecystectomy, abdominal wall reconstruction (per patient), & right oopherectomy)    Has not taken any medicines today. Antiemetic previously prescribed has not been helping    History of congenitally absent kidney. Midline pelvic kidney noted on prior Cts (12/13/24) without calculi or hydronephrosis. CT also notes prior small bowel resection, questionable slow transit/stasis at anastamosis.       Per chart review, patient most recently seen 12/13 in this ED for diffuse lower abdominal tenderness, UA  consistent with infection, CT correlates without any other acute causes. Discharged with Keflex, urine culture pan-sensitive.        REVIEW OF SYSTEMS   ROS negative unless otherwise stated in HPI    PAST MEDICAL HISTORY:  Past Medical History:   Diagnosis Date    Asthma     Depression     Migraines     Miscarriage     Pneumonia        PAST SURGICAL HISTORY:  Past Surgical History:   Procedure Laterality Date    APPENDECTOMY       SECTION      5x    CHOLECYSTECTOMY      RIGHT OOPHORECTOMY         CURRENT MEDICATIONS:    acetaminophen (TYLENOL EXTRA STRENGTH) 500 MG tablet  diazepam (VALIUM) 5 MG tablet  gabapentin (NEURONTIN) 400 MG capsule  omeprazole (PRILOSEC) 20 MG capsule  QUEtiapine (SEROQUEL) 200 MG tablet  sucralfate (CARAFATE) 1 GM tablet        ALLERGIES:  Allergies   Allergen Reactions    Ibuprofen Itching     Tolerated toradol in ED 24       FAMILY HISTORY:  Family History   Problem Relation Age of Onset    Hypertension Mother     Diabetes Mother     Hypertension Father     Diabetes Father        SOCIAL HISTORY:   Social History     Socioeconomic History    Marital status: Single   Tobacco Use    Smoking status: Every Day     Current packs/day: 0.50     Average packs/day: 0.5 packs/day for 10.0 years (5.0 ttl pk-yrs)     Types: Cigarettes    Smokeless tobacco: Never   Substance and Sexual Activity    Alcohol use: No    Drug use: Yes     Types: Marijuana     Social Drivers of Health     Financial Resource Strain: Not on File (2019)    Received from [facility]    Financial Resource Strain     Financial Resource Strain: 0   Food Insecurity: Not on File (2019)    Received from [facility]    Food Insecurity     Food: 0   Transportation Needs: Not on File (2019)    Received from [facility]    Transportation Needs     Transportation: 0   Physical Activity: Not on File (2019)    Received from [facility]    Physical Activity     Physical Activity: 0   Stress: Not on File  "(2019)    Received from [facility]    Stress     Stress: 0   Social Connections: Not on File (2019)    Received from [facility]    Social Connections     Social Connections and Isolation: 0   Housing Stability: Not on File (2019)    Received from [facility]    Housing Stability     Housin       VITALS:  /85   Pulse 107   Temp 97.7  F (36.5  C) (Oral)   Resp 17   Ht 1.626 m (5' 4\")   Wt 73.9 kg (163 lb)   SpO2 95%   BMI 27.98 kg/m      PHYSICAL EXAM    Constitutional: Well developed, Well nourished, NAD, GCS 15   HENT: Normocephalic, Atraumatic  Neck- Supple, Nontender. Normal ROM.   Eyes: Conjunctiva normal.   Respiratory: No respiratory distress, speaking in full sentences. Normal breath sounds  Cardiovascular: Normal heart rate, Regular rhythm, No murmurs.   GI: Soft, mild suprapubic abdominal tenderness. No guarding or rebound  Musculoskeletal: No deformities, Moves all extremities equally  Integument: Warm, Dry, No erythema, ecchymosis, or rash.  Neurologic: Alert & oriented x 3, Normal sensory function. No focal deficits.   Psychiatric: Affect normal, Judgment normal, Mood normal. Cooperative.      LAB:  All pertinent labs reviewed and interpreted.       RADIOLOGY:  Reviewed all pertinent imaging. Please see official radiology report.  No orders to display       EKG:    None    PROCEDURES:   None        Rubia Madera PA-C  Emergency Medicine  [facility] EMERGENCY ROOM  [address] [address] [phone]             Rubia Madera PA-C  24 1935    "

## 2024-12-27 NOTE — ED TRIAGE NOTES
PT reports intermittent abd pain. She also endorses emesis when eating. PT is tearful and anxious in triage.     PT states these sx have been ongoing for 6 months.      Triage Assessment (Adult)       Row Name 12/27/24 1147          Triage Assessment    Airway WDL WDL        Skin Circulation/Temperature WDL    Skin Circulation/Temperature WDL WDL        Cardiac WDL    Cardiac WDL WDL        Peripheral/Neurovascular WDL    Peripheral Neurovascular WDL WDL        Cognitive/Neuro/Behavioral WDL    Cognitive/Neuro/Behavioral WDL WDL

## 2025-02-04 ENCOUNTER — HOSPITAL ENCOUNTER (EMERGENCY)
Facility: CLINIC | Age: 55
Discharge: HOME | End: 2025-02-04
Admitting: PHYSICIAN ASSISTANT
Payer: MEDICARE

## 2025-02-04 VITALS
TEMPERATURE: 97.9 F | BODY MASS INDEX: 27.89 KG/M2 | HEART RATE: 85 BPM | SYSTOLIC BLOOD PRESSURE: 136 MMHG | WEIGHT: 162.5 LBS | RESPIRATION RATE: 16 BRPM | OXYGEN SATURATION: 98 % | DIASTOLIC BLOOD PRESSURE: 81 MMHG

## 2025-02-04 DIAGNOSIS — R11.2 CANNABINOID HYPEREMESIS SYNDROME: ICD-10-CM

## 2025-02-04 DIAGNOSIS — R11.2 NAUSEA AND VOMITING: ICD-10-CM

## 2025-02-04 DIAGNOSIS — F12.90 CANNABINOID HYPEREMESIS SYNDROME: ICD-10-CM

## 2025-02-04 DIAGNOSIS — E87.6 HYPOKALEMIA: ICD-10-CM

## 2025-02-04 LAB
ALBUMIN SERPL BCG-MCNC: 4.4 G/DL (ref 3.5–5.2)
ALBUMIN UR-MCNC: 50 MG/DL
ALP SERPL-CCNC: 106 U/L (ref 40–150)
ALT SERPL W P-5'-P-CCNC: 27 U/L (ref 0–50)
ANION GAP SERPL CALCULATED.3IONS-SCNC: 17 MMOL/L (ref 7–15)
APPEARANCE UR: CLEAR
AST SERPL W P-5'-P-CCNC: 37 U/L (ref 0–45)
BASOPHILS # BLD AUTO: 0 10E3/UL (ref 0–0.2)
BASOPHILS NFR BLD AUTO: 0 %
BILIRUB DIRECT SERPL-MCNC: <0.2 MG/DL (ref 0–0.3)
BILIRUB SERPL-MCNC: 0.5 MG/DL
BILIRUB UR QL STRIP: NEGATIVE
BUN SERPL-MCNC: 8.8 MG/DL (ref 6–20)
CALCIUM SERPL-MCNC: 9.8 MG/DL (ref 8.8–10.4)
CHLORIDE SERPL-SCNC: 102 MMOL/L (ref 98–107)
COLOR UR AUTO: YELLOW
CREAT SERPL-MCNC: 0.64 MG/DL (ref 0.51–0.95)
CRP SERPL-MCNC: 5.59 MG/L
EGFRCR SERPLBLD CKD-EPI 2021: >90 ML/MIN/1.73M2
EOSINOPHIL # BLD AUTO: 0 10E3/UL (ref 0–0.7)
EOSINOPHIL NFR BLD AUTO: 0 %
ERYTHROCYTE [DISTWIDTH] IN BLOOD BY AUTOMATED COUNT: 13.1 % (ref 10–15)
FLUAV RNA SPEC QL NAA+PROBE: NEGATIVE
FLUBV RNA RESP QL NAA+PROBE: NEGATIVE
GLUCOSE SERPL-MCNC: 95 MG/DL (ref 70–99)
GLUCOSE UR STRIP-MCNC: NEGATIVE MG/DL
HCO3 SERPL-SCNC: 20 MMOL/L (ref 22–29)
HCT VFR BLD AUTO: 41.4 % (ref 35–47)
HGB BLD-MCNC: 14.5 G/DL (ref 11.7–15.7)
HGB UR QL STRIP: NEGATIVE
HYALINE CASTS: 1 /LPF
IMM GRANULOCYTES # BLD: 0 10E3/UL
IMM GRANULOCYTES NFR BLD: 0 %
KETONES UR STRIP-MCNC: 150 MG/DL
LACTATE SERPL-SCNC: 1.1 MMOL/L (ref 0.7–2)
LEUKOCYTE ESTERASE UR QL STRIP: NEGATIVE
LIPASE SERPL-CCNC: 23 U/L (ref 13–60)
LYMPHOCYTES # BLD AUTO: 2.6 10E3/UL (ref 0.8–5.3)
LYMPHOCYTES NFR BLD AUTO: 35 %
MAGNESIUM SERPL-MCNC: 1.9 MG/DL (ref 1.7–2.3)
MCH RBC QN AUTO: 31 PG (ref 26.5–33)
MCHC RBC AUTO-ENTMCNC: 35 G/DL (ref 31.5–36.5)
MCV RBC AUTO: 89 FL (ref 78–100)
MONOCYTES # BLD AUTO: 0.5 10E3/UL (ref 0–1.3)
MONOCYTES NFR BLD AUTO: 6 %
MUCOUS THREADS #/AREA URNS LPF: PRESENT /LPF
NEUTROPHILS # BLD AUTO: 4.4 10E3/UL (ref 1.6–8.3)
NEUTROPHILS NFR BLD AUTO: 58 %
NITRATE UR QL: NEGATIVE
NRBC # BLD AUTO: 0 10E3/UL
NRBC BLD AUTO-RTO: 0 /100
PH UR STRIP: 6 [PH] (ref 5–7)
PLATELET # BLD AUTO: 350 10E3/UL (ref 150–450)
POTASSIUM SERPL-SCNC: 3.2 MMOL/L (ref 3.4–5.3)
PROT SERPL-MCNC: 7.6 G/DL (ref 6.4–8.3)
RBC # BLD AUTO: 4.67 10E6/UL (ref 3.8–5.2)
RBC URINE: 0 /HPF
RSV RNA SPEC NAA+PROBE: NEGATIVE
SARS-COV-2 RNA RESP QL NAA+PROBE: NEGATIVE
SODIUM SERPL-SCNC: 139 MMOL/L (ref 135–145)
SP GR UR STRIP: 1.03 (ref 1–1.03)
SQUAMOUS EPITHELIAL: 2 /HPF
UROBILINOGEN UR STRIP-MCNC: 2 MG/DL
WBC # BLD AUTO: 7.5 10E3/UL (ref 4–11)
WBC URINE: 4 /HPF

## 2025-02-04 PROCEDURE — 83605 ASSAY OF LACTIC ACID: CPT | Performed by: PHYSICIAN ASSISTANT

## 2025-02-04 PROCEDURE — 99284 EMERGENCY DEPT VISIT MOD MDM: CPT | Mod: 25

## 2025-02-04 PROCEDURE — 82247 BILIRUBIN TOTAL: CPT | Performed by: PHYSICIAN ASSISTANT

## 2025-02-04 PROCEDURE — 96374 THER/PROPH/DIAG INJ IV PUSH: CPT

## 2025-02-04 PROCEDURE — 96375 TX/PRO/DX INJ NEW DRUG ADDON: CPT

## 2025-02-04 PROCEDURE — 85048 AUTOMATED LEUKOCYTE COUNT: CPT | Performed by: PHYSICIAN ASSISTANT

## 2025-02-04 PROCEDURE — 82248 BILIRUBIN DIRECT: CPT | Performed by: PHYSICIAN ASSISTANT

## 2025-02-04 PROCEDURE — 250N000013 HC RX MED GY IP 250 OP 250 PS 637: Performed by: PHYSICIAN ASSISTANT

## 2025-02-04 PROCEDURE — 250N000011 HC RX IP 250 OP 636: Performed by: PHYSICIAN ASSISTANT

## 2025-02-04 PROCEDURE — 84075 ASSAY ALKALINE PHOSPHATASE: CPT | Performed by: PHYSICIAN ASSISTANT

## 2025-02-04 PROCEDURE — 258N000003 HC RX IP 258 OP 636: Performed by: PHYSICIAN ASSISTANT

## 2025-02-04 PROCEDURE — 96361 HYDRATE IV INFUSION ADD-ON: CPT

## 2025-02-04 PROCEDURE — 36415 COLL VENOUS BLD VENIPUNCTURE: CPT | Performed by: PHYSICIAN ASSISTANT

## 2025-02-04 PROCEDURE — 81001 URINALYSIS AUTO W/SCOPE: CPT | Performed by: PHYSICIAN ASSISTANT

## 2025-02-04 PROCEDURE — 83735 ASSAY OF MAGNESIUM: CPT | Performed by: PHYSICIAN ASSISTANT

## 2025-02-04 PROCEDURE — 87637 SARSCOV2&INF A&B&RSV AMP PRB: CPT | Performed by: PHYSICIAN ASSISTANT

## 2025-02-04 PROCEDURE — 86140 C-REACTIVE PROTEIN: CPT | Performed by: PHYSICIAN ASSISTANT

## 2025-02-04 PROCEDURE — 83690 ASSAY OF LIPASE: CPT | Performed by: PHYSICIAN ASSISTANT

## 2025-02-04 PROCEDURE — 85004 AUTOMATED DIFF WBC COUNT: CPT | Performed by: PHYSICIAN ASSISTANT

## 2025-02-04 PROCEDURE — 80053 COMPREHEN METABOLIC PANEL: CPT | Performed by: PHYSICIAN ASSISTANT

## 2025-02-04 PROCEDURE — 80048 BASIC METABOLIC PNL TOTAL CA: CPT | Performed by: PHYSICIAN ASSISTANT

## 2025-02-04 PROCEDURE — 82310 ASSAY OF CALCIUM: CPT | Performed by: PHYSICIAN ASSISTANT

## 2025-02-04 RX ORDER — DIPHENHYDRAMINE HYDROCHLORIDE 50 MG/ML
25 INJECTION INTRAMUSCULAR; INTRAVENOUS ONCE
Status: COMPLETED | OUTPATIENT
Start: 2025-02-04 | End: 2025-02-04

## 2025-02-04 RX ORDER — POTASSIUM CHLORIDE 1.5 G/1.58G
20 POWDER, FOR SOLUTION ORAL 2 TIMES DAILY
Qty: 10 PACKET | Refills: 0 | Status: SHIPPED | OUTPATIENT
Start: 2025-02-04 | End: 2025-02-09

## 2025-02-04 RX ORDER — POTASSIUM CHLORIDE 1.5 G/1.58G
40 POWDER, FOR SOLUTION ORAL ONCE
Status: COMPLETED | OUTPATIENT
Start: 2025-02-04 | End: 2025-02-04

## 2025-02-04 RX ORDER — PROCHLORPERAZINE MALEATE 10 MG
10 TABLET ORAL EVERY 6 HOURS PRN
Qty: 10 TABLET | Refills: 0 | Status: SHIPPED | OUTPATIENT
Start: 2025-02-04

## 2025-02-04 RX ORDER — KETOROLAC TROMETHAMINE 15 MG/ML
15 INJECTION, SOLUTION INTRAMUSCULAR; INTRAVENOUS ONCE
Status: COMPLETED | OUTPATIENT
Start: 2025-02-04 | End: 2025-02-04

## 2025-02-04 RX ADMIN — PROCHLORPERAZINE EDISYLATE 10 MG: 5 INJECTION INTRAMUSCULAR; INTRAVENOUS at 12:15

## 2025-02-04 RX ADMIN — KETOROLAC TROMETHAMINE 15 MG: 15 INJECTION, SOLUTION INTRAMUSCULAR; INTRAVENOUS at 12:12

## 2025-02-04 RX ADMIN — DEXTROSE AND SODIUM CHLORIDE 500 ML: 5; 900 INJECTION, SOLUTION INTRAVENOUS at 14:21

## 2025-02-04 RX ADMIN — SODIUM CHLORIDE 1000 ML: 9 INJECTION, SOLUTION INTRAVENOUS at 12:10

## 2025-02-04 RX ADMIN — POTASSIUM CHLORIDE 40 MEQ: 1.5 POWDER, FOR SOLUTION ORAL at 13:37

## 2025-02-04 RX ADMIN — DIPHENHYDRAMINE HYDROCHLORIDE 25 MG: 50 INJECTION INTRAMUSCULAR; INTRAVENOUS at 12:13

## 2025-02-04 ASSESSMENT — ACTIVITIES OF DAILY LIVING (ADL)
ADLS_ACUITY_SCORE: 41
ADLS_ACUITY_SCORE: 47

## 2025-02-04 ASSESSMENT — COLUMBIA-SUICIDE SEVERITY RATING SCALE - C-SSRS
6. HAVE YOU EVER DONE ANYTHING, STARTED TO DO ANYTHING, OR PREPARED TO DO ANYTHING TO END YOUR LIFE?: NO
1. IN THE PAST MONTH, HAVE YOU WISHED YOU WERE DEAD OR WISHED YOU COULD GO TO SLEEP AND NOT WAKE UP?: NO
2. HAVE YOU ACTUALLY HAD ANY THOUGHTS OF KILLING YOURSELF IN THE PAST MONTH?: NO

## 2025-02-04 NOTE — ED PROVIDER NOTES
EMERGENCY DEPARTMENT ENCOUNTER   NAME: Fallon Sandhu ; AGE: 54 year old female ; YOB: 1970 ; MRN: [id number] ; PCP: Phil Lange     Evaluation Date & Time: No admission date for patient encounter.    ED Provider: Kaycee Becker PA-C    CHIEF COMPLAINT     Vomiting and Abdominal Pain      FINAL ASSESSMENT       ICD-10-CM    1. Nausea and vomiting  R11.2       2. Cannabinoid hyperemesis syndrome  R11.2     F12.90       3. Hypokalemia  E87.6           ED COURSE, MEDICAL DECISION MAKING, PLAN     ED course/notable events     11:24 AM Evaluated patient in an overflow triage area due to critical boarding capacity in the ER.   1:12 PM Pt sleeping currently. Awaiting UA.   1:48 PM Rechecked and updated patient.   3:03 PM Rechecked and updated patient. Discharge and follow up plans discussed.   ______________________________________________________________________    Reason for visit   Fallon Sandhu is a 54 year old female with congenital absence of kidney, asthma, migraines, chronic pain, schizophrenia, bipolar disorder, PTSD, cannabis use disorder presenting for abdominal pain, nausea, vomiting, dysuria. States she has had on and off abdominal pain, nausea, vomiting x7 months. Has been seen in the ER a handful of times for similar. Does admit to daily marijuana use.     Exam positives   Dry and cracked lips. Diffuse abdominal discomfort with palpation. Nothing localized. The rest of the exam is benign.     Vitals   Initially tachycardic at 119, but otherwise vitally normal. This normalized with fluids.     Labs   Elevated anion gap to 17 and bicarb down to 20. Potassium slightly low at 3.2.  Ketones in urine, but non-infectious.     EKG   /    Imaging   /    Interventions/recheck   Patient was initially given 1 L of normal saline, 15 mg of Toradol, 10 mg of Compazine, 25 mg of Benadryl.  On recheck she is sleeping comfortably.  When she does wake, she tells me she is feeling  improved.  She was given an additional 500mL of D5 NS for the starvation ketosis.   She was given 40 mill equivalents of potassium chloride for the mild hypokalemia.    Consults   /    Assessment   Nausea and vomiting  Cannabinoid hyperemesis syndrome  Hypokalemia     Strongly suspect cannabinoid hyperemesis syndrome as a cause for her many months of on and off abdominal pain, nausea, and vomiting. She reports smoking marijuana every night.   She has a mild elevated metabolic acidosis which I suspect is from starvation ketosis. Ketones in urine as well to further support this.     Much lower suspicion for an acutely serious or surgical abdomen. Pain is diffuse rather than localized. Labs without leukocytosis or significant elevated CRP. No fevers. Multiple visits for the same over the last handful of months with overall benign workups including labs and CT imaging. She is also significant improved on recheck. Elected to defer CT imaging today with all the above and her history of multiple scans previously.     Certainly no evidence of a diabetic ketoacidosis with normal glucose level and no history of diabetes.     Very much doubt aortic dissection and ACS as cause for her symptoms.     Considered viral gastroenteritis, however lower concern without diarrhea, ill contacts, and symptoms being on and off for so long.     Overall, findings not consistent with an acutely serious or life threatening condition that would necessitate hospitalization.     Plan: Rx for Compazine and Potassium Chloride.  Recommended symptomatic cares including acetaminophen, NSAIDs, fluids, bland diet with small frequent meals, and discontinue all marijuana products! Recommended arranging a follow up with PCP     Indications for re-evaluation in the ER discussed.   Patient/parent/guardian understanding and agreeable with the plan and will discharge to home in good condition.      -------------------------------------------------------------------------------------------------------------  *All pertinent lab & imaging studies independently reviewed. (See chart for details)   *Discussed the results of all the tests and plan with patient and family/guardians.     HISTORY OF PRESENT ILLNESS   Patient information was obtained from: Patient   Use of Intrepreter: None     Pertinent past medical history: congenital absence of kidney, asthma, migraines, chronic pain, schizophrenia, bipolar disorder, PTSD, cannabis use disorder    Fallon Sandhu is here by means of walk in  with self for evaluation of abdominal pain, nausea, and vomiting over the last 7 months or so.    States she has been seen in the ER couple of times for it with no specific answers.    States she does have some urinary dysuria and wonders if she might have a bladder infection/kidney infection.  States that she was born without kidneys and states that she has a kidney in her lower abdomen.    Patient states that her symptoms flared up about 3 days ago.  Has had numerous episodes of vomiting.  States she is unable to keep anything down.  Has been feeling dizzy.  Feels like her lips are very dry.  She thinks she might of had a fever last night, but did not check it.    No cough, runny nose, sore throat.  No diarrhea.  No known ill contacts.    Patient does admit to smoking marijuana every night.    Patient tells me she has had 8 surgeries on her abdomen including cholecystectomy, appendectomy, small bowel obstruction, and C-sections.    No other concerns.    Per my chart review  12/27/2024: Patient presented to [facility] ER for abdominal pain and vomiting and reported this had been going on for about 6 months intermittently.  She was found to be mildly tachycardic.  Initial plan included labs and UA, however patient left prior to any of this being done.  12/13/2024: [facility] ER for urinary symptoms.  UA looked infectious.  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  Discharged with Keflex and Zofran.  10/15/2024: [facility] ER for abdominal pain, nausea, vomiting x 3 weeks.  Large laboratory workup was unrevealing.  CT of the abdomen and pelvis also unrevealing.  She was ultimately discharged to home with Zofran, Carafate, and Bentyl.  2024: [facility] ER for abdominal pain, nausea, vomiting.  but she did she she was at her skin on a large workup was unrevealing.  Discharged with Zofran.    MEDICAL HISTORY     Past Medical History:   Diagnosis Date    Asthma     Depression     Migraines     Miscarriage     Pneumonia        Past Surgical History:   Procedure Laterality Date    APPENDECTOMY       SECTION      5x    CHOLECYSTECTOMY      RIGHT OOPHORECTOMY         Family History   Problem Relation Age of Onset    Hypertension Mother     Diabetes Mother     Hypertension Father     Diabetes Father        Social History     Tobacco Use    Smoking status: Every Day     Current packs/day: 0.50     Average packs/day: 0.5 packs/day for 10.0 years (5.0 ttl pk-yrs)     Types: Cigarettes    Smokeless tobacco: Never   Substance Use Topics    Alcohol use: No    Drug use: Yes     Types: Marijuana       Medications   potassium chloride (KLOR-CON) 20 MEQ packet  prochlorperazine (COMPAZINE) 10 MG tablet  acetaminophen (TYLENOL EXTRA STRENGTH) 500 MG tablet  diazepam (VALIUM) 5 MG tablet  gabapentin (NEURONTIN) 400 MG capsule  omeprazole (PRILOSEC) 20 MG capsule  QUEtiapine (SEROQUEL) 200 MG tablet  sucralfate (CARAFATE) 1 GM tablet          PHYSICAL EXAM     First Vitals:  Patient Vitals for the past 24 hrs:   BP Temp Temp src Pulse Resp SpO2 Weight   25 1330 119/73 -- -- 79 -- 97 % --   25 1300 133/77 -- -- 87 -- 98 % --   25 1230 (!) 142/78 -- -- 82 -- 96 % --   25 1220 106/66 -- -- 94 -- 98 %  --   02/04/25 1101 138/73 97.4  F (36.3  C) Oral 119 24 98 % 73.7 kg (162 lb 8 oz)       PHYSICAL EXAM:   Constitutional: No acute distress.  Neuro: Awake and alert. No focal deficits.  Psych: Calm and cooperative.  Eyes: PERRL. EOMI. Conjunctivae clear. No crusting or mattering of the lids or lashes.     Nose: Nostrils patent. No congestion or turbinate inflammation.   Mouth: Lips are dry and cracked.     Neck: FROM.   Cardio: . Adequate perfusion to extremities. Regular rhythm. No murmurs.  Pulmonary: Oxygenating well on RA. No labored breathing. No wheezes. No rales. No rhonchi.   Abdomen: Diffuse abdominal discomfort with palpation. BS present. Soft. Non-distended. No rigidity. No rebound. No guarding.   Back: Appears to move freely.    Upper extremities: Moves freely.    Lower extremities: Moves freely. No edema.   Skin: Natural color, warm, dry, intact.         RESULTS     LAB:  All pertinent labs reviewed and interpreted  Labs Ordered and Resulted from Time of ED Arrival to Time of ED Departure   UA MACROSCOPIC WITH REFLEX TO MICRO AND CULTURE - Abnormal       Result Value    Color Urine Yellow      Appearance Urine Clear      Glucose Urine Negative      Bilirubin Urine Negative      Ketones Urine 150 (*)     Specific Gravity Urine 1.026      Blood Urine Negative      pH Urine 6.0      Protein Albumin Urine 50 (*)     Urobilinogen Urine 2.0 (*)     Nitrite Urine Negative      Leukocyte Esterase Urine Negative      Mucus Urine Present (*)     RBC Urine 0      WBC Urine 4      Squamous Epithelials Urine 2 (*)     Hyaline Casts Urine 1     BASIC METABOLIC PANEL - Abnormal    Sodium 139      Potassium 3.2 (*)     Chloride 102      Carbon Dioxide (CO2) 20 (*)     Anion Gap 17 (*)     Urea Nitrogen 8.8      Creatinine 0.64      GFR Estimate >90      Calcium 9.8      Glucose 95     CRP INFLAMMATION - Abnormal    CRP Inflammation 5.59 (*)    INFLUENZA A/B, RSV AND SARS-COV2 PCR - Normal    Influenza A PCR  Negative      Influenza B PCR Negative      RSV PCR Negative      SARS CoV2 PCR Negative     HEPATIC FUNCTION PANEL - Normal    Protein Total 7.6      Albumin 4.4      Bilirubin Total 0.5      Alkaline Phosphatase 106      AST 37      ALT 27      Bilirubin Direct <0.20     LIPASE - Normal    Lipase 23     MAGNESIUM - Normal    Magnesium 1.9     LACTIC ACID WHOLE BLOOD WITH 1X REPEAT IN 2 HR WHEN >2 - Normal    Lactic Acid, Initial 1.1     CBC WITH PLATELETS AND DIFFERENTIAL    WBC Count 7.5      RBC Count 4.67      Hemoglobin 14.5      Hematocrit 41.4      MCV 89      MCH 31.0      MCHC 35.0      RDW 13.1      Platelet Count 350      % Neutrophils 58      % Lymphocytes 35      % Monocytes 6      % Eosinophils 0      % Basophils 0      % Immature Granulocytes 0      NRBCs per 100 WBC 0      Absolute Neutrophils 4.4      Absolute Lymphocytes 2.6      Absolute Monocytes 0.5      Absolute Eosinophils 0.0      Absolute Basophils 0.0      Absolute Immature Granulocytes 0.0      Absolute NRBCs 0.0         RADIOLOGY:  No orders to display       PROCEDURES     None          MEDICATIONS GIVEN IN THE EMERGENCY DEPARTMENT:  Medications   sodium chloride 0.9% BOLUS 1,000 mL (1,000 mLs Intravenous $New Bag 2/4/25 1210)   ketorolac (TORADOL) injection 15 mg (15 mg Intravenous $Given 2/4/25 1212)   prochlorperazine (COMPAZINE) injection 10 mg (10 mg Intravenous $Given 2/4/25 1215)   diphenhydrAMINE (BENADRYL) injection 25 mg (25 mg Intravenous $Given 2/4/25 1213)   potassium chloride (KLOR-CON) Packet 40 mEq (40 mEq Oral $Given 2/4/25 1337)   dextrose 5% and 0.9% NaCl BOLUS 500 mL (0 mLs Intravenous Stopped 2/4/25 1457)       NEW PRESCRIPTIONS STARTED AT TODAY'S ED VISIT:  New Prescriptions    POTASSIUM CHLORIDE (KLOR-CON) 20 MEQ PACKET    Take 20 mEq by mouth 2 times daily for 5 days.    PROCHLORPERAZINE (COMPAZINE) 10 MG TABLET    Take 1 tablet (10 mg) by mouth every 6 hours as needed for nausea or vomiting.            MEDICAL  DECISION MAKING:  Medical Decision Making  Obtained supplemental history:Supplemental history obtained?: No  Reviewed external records: External records reviewed?: Documented in chart  Care impacted by chronic illness:Documented in Chart  Did you consider but not order tests?: Work up considered but not performed and documented in chart, if applicable  Did you interpret images independently?: Independent interpretation of ECG and images noted in documentation, when applicable.  Consultation discussion with other provider:Did you involve another provider (consultant, , pharmacy, etc.)?: No  Discharge. I prescribed additional prescription strength medication(s) as charted. See documentation for any additional details.    MIPS: Not Applicable      CRITICAL CARE:  N/A           SCRIBE ATTESTATION  N/A      Some or all of this documentation has been completed using dictation software and grammatical errors may be present. Please contact me with any concerns regarding this.     Kaycee Becker PA-C  Emergency Medicine   [facility] EMERGENCY ROOM       Kaycee Becker PA-C  02/04/25 2819

## 2025-02-04 NOTE — ED TRIAGE NOTES
Vomiting for 7 months. Abd pain.  Possible bladder infection.  Dysuria.  Dizziness as well.  Lips dry and chapped

## 2025-02-04 NOTE — DISCHARGE INSTRUCTIONS
You were seen in the ER for nausea, vomiting, and abdominal pain. Your laboratory workup reveals dehydration/starvation from not eating and drinking. Your potassium is also slightly low. You were given fluids and a potassium supplement to help with that. The rest of your labs were reassuring.   I strongly suspect you are dealing with a condition called cannabinoid hyperemesis syndrome which can happen to people who use marijuana regularly. This causes on and off abdominal pain, nausea, and vomiting which can be quite severe.   Medications prescribed: Compazine to be used every 6 hours as needed for nausea. Potassium to be used twice a day for 5 days.   Recommendations: Discontinue all marijuana products. It can take up to 3 months after stopping marijuana use to see symptoms resolve.   Work on staying hydrated. Take small frequent sips of fluids. Stephenson diet as tolerated.     Schedule with your primary care provider for follow up.    Return to the ER for any new or worsening symptoms.

## 2025-02-12 ENCOUNTER — HOSPITAL ENCOUNTER (OUTPATIENT)
Dept: CT IMAGING | Facility: HOSPITAL | Age: 55
Discharge: HOME | End: 2025-02-12
Attending: OTOLARYNGOLOGY
Payer: MEDICARE

## 2025-02-12 ENCOUNTER — OFFICE VISIT (OUTPATIENT)
Dept: OTOLARYNGOLOGY | Facility: CLINIC | Age: 55
End: 2025-02-12
Payer: MEDICARE

## 2025-02-12 DIAGNOSIS — J34.89 NASAL CRUSTING: ICD-10-CM

## 2025-02-12 DIAGNOSIS — J32.9 CHRONIC RHINOSINUSITIS: ICD-10-CM

## 2025-02-12 DIAGNOSIS — R09.81 CONGESTION OF PARANASAL SINUS: Primary | ICD-10-CM

## 2025-02-12 DIAGNOSIS — R43.9 SMELL DISTURBANCE: ICD-10-CM

## 2025-02-12 DIAGNOSIS — R43.0 ANOSMIA: ICD-10-CM

## 2025-02-12 DIAGNOSIS — R09.81 NASAL CONGESTION: ICD-10-CM

## 2025-02-12 PROCEDURE — 70486 CT MAXILLOFACIAL W/O DYE: CPT

## 2025-02-12 RX ORDER — MUPIROCIN 20 MG/G
OINTMENT TOPICAL
Qty: 22 G | Refills: 0 | Status: SHIPPED | OUTPATIENT
Start: 2025-02-12

## 2025-02-12 RX ORDER — AZELASTINE 1 MG/ML
SPRAY, METERED NASAL
Qty: 30 ML | Refills: 11 | Status: SHIPPED | OUTPATIENT
Start: 2025-02-22

## 2025-02-12 NOTE — LETTER
2/12/2025      Fallon Sandhu  [address]  [facility] [address]      Dear Colleague,    Thank you for referring your patient, Fallon Sandhu, to the [facility]. Please see a copy of my visit note below.    CHIEF COMPLAINT: Patient presents with:  Sinus Problem: Sinus issus since childhood,ears and throat itch, facial pain, can't smell, bilateral sores in her nose. PT is a smoker since 2006. SNOT 60         HISTORY OF PRESENT ILLNESS    Fallon was seen at the behest of Nicolette Sutton NP for sinus concerns.     Sino-Nasal Outcome Test (SNOT - 22)    1. Need to Blow Nose: (Proxy-Rptd) (P) Bad as it can be  2. Nasal Blockage: (Proxy-Rptd) (P) Severe  3. Sneezing: (Proxy-Rptd) (P) None  4. Runny Nose: (Proxy-Rptd) (P) None  5. Cough: (Proxy-Rptd) (P) None  6. Post-nasal discharge: (Proxy-Rptd) (P) Severe  7. Thick nasal discharge: (Proxy-Rptd) (P) Severe  8. Ear fullness: (Proxy-Rptd) (P) Severe  9. Dizziness: (Proxy-Rptd) (P) None  10. Ear Pain: (Proxy-Rptd) (P) None  11. Facial pain/pressure: (Proxy-Rptd) (P) Bad as it can be  12. Decreased Sense of Smell/Taste: (Proxy-Rptd) (P) Bad as it can be  13. Difficulty falling asleep: (Proxy-Rptd) (P) Severe  14. Wake up at night: (Proxy-Rptd) (P) Severe  15. Lack of a good night's sleep: (Proxy-Rptd) (P) Severe  16. Wake up tired: (Proxy-Rptd) (P) Severe  17. Fatigue: (Proxy-Rptd) (P) Severe  18. Reduced Productivity: (Proxy-Rptd) (P) None  19. Reduced Concentration: (Proxy-Rptd) (P) None  20. Frustrated/restless/irritable: (Proxy-Rptd) (P) Severe  21. Sad: (Proxy-Rptd) (P) None  22. Embarrassed: (Proxy-Rptd) (P) Severe    Total Score: (Proxy-Rptd) (P) 59    COPYRIGHT 1996. [facility] [place] . [place]       UPSIT:  11 (total anosmia)       REVIEW OF SYSTEMS    Review of Systems as per HPI and PMHx, otherwise 10 system review system are negative.       ALLERGIES    Ibuprofen    CURRENT MEDICATIONS      Current Outpatient  Medications:      acetaminophen (TYLENOL EXTRA STRENGTH) 500 MG tablet, [ACETAMINOPHEN (TYLENOL EXTRA STRENGTH) 500 MG TABLET] Take 2 tablets (1,000 mg total) by mouth every 6 (six) hours as needed for pain. (Patient not taking: Reported on 2024), Disp: , Rfl: 0     [START ON 2025] azelastine (ASTELIN) 0.1 % nasal spray, 2 sprays each nostril 1-2x daily as needed for nasal congestion (use nightly for first 2 week), Disp: 30 mL, Rfl: 11     diazepam (VALIUM) 5 MG tablet, Take 1 tab 2 hrs before MRI, take 1 tab 1 hr before MRI only if needed (Patient not taking: Reported on 2024), Disp: 2 tablet, Rfl: 0     gabapentin (NEURONTIN) 400 MG capsule, Take 1 capsule (400 mg) by mouth 3 times daily, Disp: 90 capsule, Rfl: 1     mupirocin (BACTROBAN) 2 % external ointment, Apply a pea sized amount to inside of each nostril 3x daily with Qtip for 10 days, Disp: 22 g, Rfl: 0     omeprazole (PRILOSEC) 20 MG capsule, [OMEPRAZOLE (PRILOSEC) 20 MG CAPSULE] Take 1 capsule (20 mg total) by mouth daily before breakfast. (Patient not taking: Reported on 2024), Disp: 30 capsule, Rfl: 0     prochlorperazine (COMPAZINE) 10 MG tablet, Take 1 tablet (10 mg) by mouth every 6 hours as needed for nausea or vomiting., Disp: 10 tablet, Rfl: 0     QUEtiapine (SEROQUEL) 200 MG tablet, Take 200 mg by mouth at bedtime, Disp: , Rfl:      sucralfate (CARAFATE) 1 GM tablet, Take 1 tablet (1 g) by mouth 4 times daily., Disp: 28 tablet, Rfl: 0     PAST MEDICAL HISTORY    PAST MEDICAL HISTORY:   Past Medical History:   Diagnosis Date     Asthma      Depression      Migraines      Miscarriage      Pneumonia        PAST SURGICAL HISTORY    PAST SURGICAL HISTORY:   Past Surgical History:   Procedure Laterality Date     APPENDECTOMY        SECTION      5x     CHOLECYSTECTOMY       RIGHT OOPHORECTOMY         FAMILY  HISTORY    FAMILY HISTORY:   Family History   Problem Relation Age of Onset     Hypertension Mother      Diabetes  Mother      Hypertension Father      Diabetes Father        SOCIAL HISTORY    SOCIAL HISTORY:   Social History     Tobacco Use     Smoking status: Every Day     Current packs/day: 0.50     Average packs/day: 0.5 packs/day for 10.0 years (5.0 ttl pk-yrs)     Types: Cigarettes     Smokeless tobacco: Never   Substance Use Topics     Alcohol use: No        PHYSICAL EXAM    HEAD: Normal appearance and symmetry:  No cutaneous lesions.      NECK:  supple     EARS:    Right:   TM intact   LEFT:   TM intact    EYES:  EOMI    CN VII/XII:  intact     NOSE:     Dorsum:   straight  Septum:  midline  Mucosa:  minimal crusting LEFT  ITH: 3+        ORAL CAVITY/OROPHARYNX:     Lips:  Normal.  Tongue: normal, midline  Mucosa:   no lesions     NECK:  Trachea:  midline.              Thyroid:  normal              Adenopathy:  none        NEURO:   Alert and Oriented     GAIT AND STATION:  normal     RESPIRATORY:   Symmetry and Respiratory effort     PSYCH:  Normal mood and affect     SKIN:   warm and dry         IMPRESSION:    Encounter Diagnoses   Name Primary?     Chronic rhinosinusitis      Congestion of paranasal sinus Yes     Smell disturbance      Nasal crusting      Nasal congestion      Anosmia           RECOMMENDATIONS:      Orders Placed This Encounter   Procedures     MO UPSIT TEST FOR SMELL IDENTIFICATION     CT Sinus w/o Contrast     Orders Placed This Encounter   Medications     mupirocin (BACTROBAN) 2 % external ointment     Sig: Apply a pea sized amount to inside of each nostril 3x daily with Qtip for 10 days     Dispense:  22 g     Refill:  0     azelastine (ASTELIN) 0.1 % nasal spray     Si sprays each nostril 1-2x daily as needed for nasal congestion (use nightly for first 2 week)     Dispense:  30 mL     Refill:  11      CT results via letter/telephone        Again, thank you for allowing me to participate in the care of your patient.        Sincerely,        Alexandr Khoury MD    Electronically signed

## 2025-02-12 NOTE — PROGRESS NOTES
CHIEF COMPLAINT: Patient presents with:  Sinus Problem: Sinus issus since childhood,ears and throat itch, facial pain, can't smell, bilateral sores in her nose. PT is a smoker since 2006. SNOT 60         HISTORY OF PRESENT ILLNESS    Fallon was seen at the behest of Nicolette Sutton NP for sinus concerns.     Sino-Nasal Outcome Test (SNOT - 22)    1. Need to Blow Nose: (Proxy-Rptd) (P) Bad as it can be  2. Nasal Blockage: (Proxy-Rptd) (P) Severe  3. Sneezing: (Proxy-Rptd) (P) None  4. Runny Nose: (Proxy-Rptd) (P) None  5. Cough: (Proxy-Rptd) (P) None  6. Post-nasal discharge: (Proxy-Rptd) (P) Severe  7. Thick nasal discharge: (Proxy-Rptd) (P) Severe  8. Ear fullness: (Proxy-Rptd) (P) Severe  9. Dizziness: (Proxy-Rptd) (P) None  10. Ear Pain: (Proxy-Rptd) (P) None  11. Facial pain/pressure: (Proxy-Rptd) (P) Bad as it can be  12. Decreased Sense of Smell/Taste: (Proxy-Rptd) (P) Bad as it can be  13. Difficulty falling asleep: (Proxy-Rptd) (P) Severe  14. Wake up at night: (Proxy-Rptd) (P) Severe  15. Lack of a good night's sleep: (Proxy-Rptd) (P) Severe  16. Wake up tired: (Proxy-Rptd) (P) Severe  17. Fatigue: (Proxy-Rptd) (P) Severe  18. Reduced Productivity: (Proxy-Rptd) (P) None  19. Reduced Concentration: (Proxy-Rptd) (P) None  20. Frustrated/restless/irritable: (Proxy-Rptd) (P) Severe  21. Sad: (Proxy-Rptd) (P) None  22. Embarrassed: (Proxy-Rptd) (P) Severe    Total Score: (Proxy-Rptd) (P) 59    COPYRIGHT 1996. [facility] [place] . [facility],[place]       UPSIT:  11 (total anosmia)       REVIEW OF SYSTEMS    Review of Systems as per HPI and PMHx, otherwise 10 system review system are negative.       ALLERGIES    Ibuprofen    CURRENT MEDICATIONS      Current Outpatient Medications:     acetaminophen (TYLENOL EXTRA STRENGTH) 500 MG tablet, [ACETAMINOPHEN (TYLENOL EXTRA STRENGTH) 500 MG TABLET] Take 2 tablets (1,000 mg total) by mouth every 6 (six) hours as needed for pain. (Patient not taking: Reported on  2024), Disp: , Rfl: 0    [START ON 2025] azelastine (ASTELIN) 0.1 % nasal spray, 2 sprays each nostril 1-2x daily as needed for nasal congestion (use nightly for first 2 week), Disp: 30 mL, Rfl: 11    diazepam (VALIUM) 5 MG tablet, Take 1 tab 2 hrs before MRI, take 1 tab 1 hr before MRI only if needed (Patient not taking: Reported on 2024), Disp: 2 tablet, Rfl: 0    gabapentin (NEURONTIN) 400 MG capsule, Take 1 capsule (400 mg) by mouth 3 times daily, Disp: 90 capsule, Rfl: 1    mupirocin (BACTROBAN) 2 % external ointment, Apply a pea sized amount to inside of each nostril 3x daily with Qtip for 10 days, Disp: 22 g, Rfl: 0    omeprazole (PRILOSEC) 20 MG capsule, [OMEPRAZOLE (PRILOSEC) 20 MG CAPSULE] Take 1 capsule (20 mg total) by mouth daily before breakfast. (Patient not taking: Reported on 2024), Disp: 30 capsule, Rfl: 0    prochlorperazine (COMPAZINE) 10 MG tablet, Take 1 tablet (10 mg) by mouth every 6 hours as needed for nausea or vomiting., Disp: 10 tablet, Rfl: 0    QUEtiapine (SEROQUEL) 200 MG tablet, Take 200 mg by mouth at bedtime, Disp: , Rfl:     sucralfate (CARAFATE) 1 GM tablet, Take 1 tablet (1 g) by mouth 4 times daily., Disp: 28 tablet, Rfl: 0     PAST MEDICAL HISTORY    PAST MEDICAL HISTORY:   Past Medical History:   Diagnosis Date    Asthma     Depression     Migraines     Miscarriage     Pneumonia        PAST SURGICAL HISTORY    PAST SURGICAL HISTORY:   Past Surgical History:   Procedure Laterality Date    APPENDECTOMY       SECTION      5x    CHOLECYSTECTOMY      RIGHT OOPHORECTOMY         FAMILY  HISTORY    FAMILY HISTORY:   Family History   Problem Relation Age of Onset    Hypertension Mother     Diabetes Mother     Hypertension Father     Diabetes Father        SOCIAL HISTORY    SOCIAL HISTORY:   Social History     Tobacco Use    Smoking status: Every Day     Current packs/day: 0.50     Average packs/day: 0.5 packs/day for 10.0 years (5.0 ttl pk-yrs)     Types:  Cigarettes    Smokeless tobacco: Never   Substance Use Topics    Alcohol use: No        PHYSICAL EXAM    HEAD: Normal appearance and symmetry:  No cutaneous lesions.      NECK:  supple     EARS:    Right:   TM intact   LEFT:   TM intact    EYES:  EOMI    CN VII/XII:  intact     NOSE:     Dorsum:   straight  Septum:  midline  Mucosa:  minimal crusting LEFT  ITH: 3+        ORAL CAVITY/OROPHARYNX:     Lips:  Normal.  Tongue: normal, midline  Mucosa:   no lesions     NECK:  Trachea:  midline.              Thyroid:  normal              Adenopathy:  none        NEURO:   Alert and Oriented     GAIT AND STATION:  normal     RESPIRATORY:   Symmetry and Respiratory effort     PSYCH:  Normal mood and affect     SKIN:   warm and dry         IMPRESSION:    Encounter Diagnoses   Name Primary?    Chronic rhinosinusitis     Congestion of paranasal sinus Yes    Smell disturbance     Nasal crusting     Nasal congestion     Anosmia           RECOMMENDATIONS:      Orders Placed This Encounter   Procedures    WI UPSIT TEST FOR SMELL IDENTIFICATION    CT Sinus w/o Contrast     Orders Placed This Encounter   Medications    mupirocin (BACTROBAN) 2 % external ointment     Sig: Apply a pea sized amount to inside of each nostril 3x daily with Qtip for 10 days     Dispense:  22 g     Refill:  0    azelastine (ASTELIN) 0.1 % nasal spray     Si sprays each nostril 1-2x daily as needed for nasal congestion (use nightly for first 2 week)     Dispense:  30 mL     Refill:  11      CT results via letter/telephone

## 2025-02-12 NOTE — PATIENT INSTRUCTIONS
You were seen in the ENT Clinic today by Dr. Khoury. If you have any questions or concerns after your appointment, please contact us (see below).    2.   CT scan of your sinuses- your results will be available via [employer]    3.   Astelin nasal spray and mupirocin ointment were sent to your pharmacy      How to Contact Us:  Send a [employer] message to your provider. Our team will respond to you via [employer]. Occasionally, we will need to call you to get further information.  For urgent matters (Monday-Friday), call the ENT Clinic: [phone] and speak with a call center team member - they will route your call appropriately.   If you'd like to speak directly with a nurse, please find our contact information below. We do our best to check voicemail frequently throughout the day, and will work to call you back within 1-2 days. For urgent matters, please use the general clinic phone numbers listed above.      Carissa Akins RN  [facility]  [address] [place] [id number]  [email or web address]  Office: [phone]  Fax: [phone]

## 2025-02-26 ENCOUNTER — TELEPHONE (OUTPATIENT)
Dept: OTOLARYNGOLOGY | Facility: CLINIC | Age: 55
End: 2025-02-26
Payer: MEDICARE

## 2025-02-26 NOTE — TELEPHONE ENCOUNTER
[facility] Call Center    Phone Message    May a detailed message be left on voicemail: yes     Reason for Call: Other: Pt called in regards to her CT results.She would like to go over those, as well as another refill of her medication for her sinuses. Please call pt back to discuss      Action Taken: Other: [facility]    Travel Screening: Not Applicable     Date of Service:

## 2025-02-26 NOTE — TELEPHONE ENCOUNTER
Phone call to pt.  Reviewed CT sinus report which did not note any abnormal findings  except for a deviated septum.  Explained pt can call her pharmacy when she needs a refill of Azelastine. Pt asks when to follow up in clinic for a nose recheck.  Will check with Dr Khoury and will advise pt.  Alison Hoffman RN

## 2025-02-27 NOTE — TELEPHONE ENCOUNTER
Left message for pt to call back to schedule in next available ENT appointment.  Carissa Plaza RN on 2/27/2025 at 2:34 PM

## 2025-03-03 NOTE — TELEPHONE ENCOUNTER
Spoke to patient and scheduled follow up appointment for this month with Dr. Khoury.   Carissa Plaza, RN on 3/3/2025 at 1:56 PM

## 2025-03-17 ENCOUNTER — TELEPHONE (OUTPATIENT)
Dept: OTOLARYNGOLOGY | Facility: CLINIC | Age: 55
End: 2025-03-17
Payer: MEDICARE

## 2025-03-17 NOTE — TELEPHONE ENCOUNTER
Left detailed message for pt. It doesn't appear that dr. Khoury sent in any new medications at her last visit. If she is looking for a refill of the Astelin nasal spray, she can contact her pharmacy as there was 11 refills sent in in February. Encouraged her to call back if she had any other questions.  Carissa Plaza RN on 3/17/2025 at 1:57 PM

## 2025-03-17 NOTE — TELEPHONE ENCOUNTER
[facility] Call Center    Phone Message    May a detailed message be left on voicemail: yes     Reason for Call: Other: Pt called stating she needs a new prescription for her sinuses. She thought one was sent to her pharmacy, but nothing was ready for . Please review and call pt back to discuss     Action Taken: Other: [facility]    Travel Screening: Not Applicable     Date of Service:

## 2025-06-15 ENCOUNTER — HOSPITAL ENCOUNTER (EMERGENCY)
Facility: CLINIC | Age: 55
Discharge: HOME | End: 2025-06-15
Attending: EMERGENCY MEDICINE | Admitting: EMERGENCY MEDICINE
Payer: MEDICARE

## 2025-06-15 VITALS
HEIGHT: 62 IN | RESPIRATION RATE: 22 BRPM | BODY MASS INDEX: 32.39 KG/M2 | DIASTOLIC BLOOD PRESSURE: 85 MMHG | OXYGEN SATURATION: 99 % | TEMPERATURE: 97.5 F | HEART RATE: 95 BPM | WEIGHT: 176 LBS | SYSTOLIC BLOOD PRESSURE: 148 MMHG

## 2025-06-15 DIAGNOSIS — T78.40XA ALLERGIC REACTION, INITIAL ENCOUNTER: ICD-10-CM

## 2025-06-15 PROCEDURE — 99284 EMERGENCY DEPT VISIT MOD MDM: CPT

## 2025-06-15 RX ORDER — PREDNISONE 10 MG/1
TABLET ORAL
Qty: 32 TABLET | Refills: 0 | Status: SHIPPED | OUTPATIENT
Start: 2025-06-15 | End: 2025-06-25

## 2025-06-15 RX ORDER — HYDROXYZINE HYDROCHLORIDE 25 MG/1
25 TABLET, FILM COATED ORAL 4 TIMES DAILY
Qty: 40 TABLET | Refills: 0 | Status: SHIPPED | OUTPATIENT
Start: 2025-06-15

## 2025-06-15 ASSESSMENT — ACTIVITIES OF DAILY LIVING (ADL): ADLS_ACUITY_SCORE: 41

## 2025-06-16 NOTE — ED TRIAGE NOTES
Patient presents to the ED complaining of a rash 10 days post a CT scan.  She states she contacted her doctor about it and has an upcoming appointment but the rash is not improving.  Denies airway involvement.       Triage Assessment (Adult)       Row Name 06/15/25 5199          Triage Assessment    Airway WDL WDL        Respiratory WDL    Respiratory WDL WDL        Skin Circulation/Temperature WDL    Skin Circulation/Temperature WDL WDL        Cardiac WDL    Cardiac WDL WDL        Peripheral/Neurovascular WDL    Peripheral Neurovascular WDL WDL        Cognitive/Neuro/Behavioral WDL    Cognitive/Neuro/Behavioral WDL WDL

## 2025-06-16 NOTE — ED PROVIDER NOTES
EMERGENCY DEPARTMENT ENCOUNTER      NAME: Fallon Sandhu  AGE: 54 year old female  YOB: 1970  MRN: [id number]  EVALUATION DATE & TIME: No admission date for patient encounter.    [facility]    ED PROVIDER: Lionel Lozano M.D.      Chief Complaint   Patient presents with    Rash         FINAL IMPRESSION:  1.  Acute allergic reaction/fixed drug eruption.      ED COURSE & MEDICAL DECISION MAKING:    10:56 PM I met with the patient to gather history and to perform my initial exam. We discussed plans for the ED course, including diagnostic testing and treatment. PPE worn: cloth mask.  Patient with 10 days above fixed drug eruption and hives to face and head and itching since CT with IV contrast.  Patient was discharged home on prednisone and Atarax.  Patient in agreement with the plan.  She has a follow-up appointment with her clinic later this week.      Pertinent Labs & Imaging studies reviewed. (See chart for details)  54 year old female presents to the Emergency Department for evaluation of rash.    At the conclusion of the encounter I discussed the results of all of the tests and the disposition. The questions were answered. The patient or family acknowledged understanding and was agreeable with the care plan.              Medical Decision Making  History reviewed with the patient.  Computer inpatient outpatient records reviewed.    Patient will be discharged home on prednisone and Atarax.    MIPS (CTPE, Dental pain, Michael, Sinusitis, Asthma/COPD, Head Trauma): Not Applicable    SEPSIS: None          MEDICATIONS GIVEN IN THE EMERGENCY:  Medications - No data to display    NEW PRESCRIPTIONS STARTED AT TODAY'S ER VISIT  New Prescriptions    No medications on file          =================================================================    HPI    Patient information was obtained from: The patient.    Use of : N/A         Fallon Sandhu is a 54 year old female  with a pertinent history of schizophrenia and depression who presents to this ED today for evaluation of rash.  On  patient underwent CTA of the head.  Diagnosis of a branch retinal vein occlusion in the left eye.  This was with IV contrast.  Since that time she has had a macular red rash to face and head blanches with palpation and itches.  Patient has an appointment coming up with her clinic in the next week.  She denies any difficulty breathing or swallowing.    She does not identify any waxing or waning symptoms otherwise, exacerbating or alleviating features, associated symptoms except as mentioned.  She otherwise denies any pain related complaints.    REVIEW OF SYSTEMS   Review of Systems rash with pruritus.  No difficulty breathing.    PAST MEDICAL HISTORY:  Past Medical History:   Diagnosis Date    Asthma     Depression     Migraines     Miscarriage     Pneumonia        PAST SURGICAL HISTORY:  Past Surgical History:   Procedure Laterality Date    APPENDECTOMY       SECTION      5x    CHOLECYSTECTOMY      RIGHT OOPHORECTOMY             CURRENT MEDICATIONS:    acetaminophen (TYLENOL EXTRA STRENGTH) 500 MG tablet  azelastine (ASTELIN) 0.1 % nasal spray  diazepam (VALIUM) 5 MG tablet  gabapentin (NEURONTIN) 400 MG capsule  mupirocin (BACTROBAN) 2 % external ointment  omeprazole (PRILOSEC) 20 MG capsule  prochlorperazine (COMPAZINE) 10 MG tablet  QUEtiapine (SEROQUEL) 200 MG tablet  sucralfate (CARAFATE) 1 GM tablet        ALLERGIES:  Allergies   Allergen Reactions    Ibuprofen Itching     Tolerated toradol in ED 24       FAMILY HISTORY:  Family History   Problem Relation Age of Onset    Hypertension Mother     Diabetes Mother     Hypertension Father     Diabetes Father        SOCIAL HISTORY:   Social History     Socioeconomic History    Marital status: Single   Tobacco Use    Smoking status: Every Day     Current packs/day: 0.50     Average packs/day: 0.5 packs/day for 10.0 years (5.0 ttl  "pk-yrs)     Types: Cigarettes    Smokeless tobacco: Never   Substance and Sexual Activity    Alcohol use: No    Drug use: Yes     Types: Marijuana     Social Drivers of Health     Financial Resource Strain: Not on File (2019)    Received from [facility]    Financial Resource Strain     Financial Resource Strain: 0   Food Insecurity: Not on File (2019)    Received from [facility]    Food Insecurity     Food: 0   Transportation Needs: Not on File (2019)    Received from [facility]    Transportation Needs     Transportation: 0   Physical Activity: Not on File (2019)    Received from [facility]    Physical Activity     Physical Activity: 0   Stress: Not on File (2019)    Received from [facility]    Stress     Stress: 0   Social Connections: Not on File (2019)    Received from [facility]    Social Connections     Social Connections and Isolation: 0   Housing Stability: Not on File (2019)    Received from [facility]    Housing Stability     Housin     Daily tobacco.  No alcohol.  Marijuana use    VITALS:  BP (!) 148/85   Pulse 95   Temp 97.5  F (36.4  C) (Temporal)   Resp 22   Ht 1.575 m (5' 2\")   Wt 79.8 kg (176 lb)   SpO2 99%   BMI 32.19 kg/m      PHYSICAL EXAM    Vital Signs:  BP (!) 148/85   Pulse 95   Temp 97.5  F (36.4  C) (Temporal)   Resp 22   Ht 1.575 m (5' 2\")   Wt 79.8 kg (176 lb)   SpO2 99%   BMI 32.19 kg/m    General:  On entering the room she is in no apparent distress.    Neck:  Neck supple with full range of motion and nontender.    Back:  Back and spine are nontender.  No costovertebral angle tenderness.    HEENT:  Oropharynx clear with moist mucous membranes.  HEENT unremarkable.  Oropharynx clear.  No stridor.  Pulmonary:  Chest clear to auscultation without rhonchi rales or wheezing.    Cardiovascular:  Cardiac regular rate and rhythm without murmurs rubs or gallops.    Abdomen:  Abdomen soft nontender.  There is no rebound or guarding.    Muskuloskeletal:  She moves all 4 without any " difficulty and has normal neurovascular exams.  Extremities without clubbing, cyanosis, or edema.  Legs and calves are nontender.    Neuro:  She is alert and oriented ×3 and moves all extremities symmetrically.    Psych:  Normal affect.    Skin:  Unremarkable and warm and dry.  Urticaria/fixed drug eruption red raised areas to the face and head.  These riley with palpation.  Patient notes they itch.       LAB:  All pertinent labs reviewed and interpreted.  Labs Ordered and Resulted from Time of ED Arrival to Time of ED Departure - No data to display    RADIOLOGY:  Reviewed all pertinent imaging. Please see official radiology report.  No orders to display                Lionel Lozano M.D.  Emergency Medicine  [facility] EMERGENCY ROOM  [address] [facility] [id number] [phone]  Dept: [phone]       Lionel Lozano MD  06/15/25 9922

## 2025-06-16 NOTE — DISCHARGE INSTRUCTIONS
Avoid scratching.  In the future, if you need a CT, avoid IV contrast dye.  You must follow-up with your doctor for repeat examination in clinic sometime this week.  Prednisone taper as prescribed over the next 14 days.  Atarax 4 times a day for the next 10 days.  Do not drive with this.

## 2025-06-16 NOTE — ED NOTES
Patient discharged from lobby, please see provider note for primary assessment.  Declined repeat vitals prior to discharge.